# Patient Record
Sex: MALE | Race: BLACK OR AFRICAN AMERICAN | NOT HISPANIC OR LATINO | URBAN - METROPOLITAN AREA
[De-identification: names, ages, dates, MRNs, and addresses within clinical notes are randomized per-mention and may not be internally consistent; named-entity substitution may affect disease eponyms.]

---

## 2017-11-11 ENCOUNTER — OUTPATIENT (OUTPATIENT)
Dept: OUTPATIENT SERVICES | Facility: HOSPITAL | Age: 57
LOS: 1 days | Discharge: HOME | End: 2017-11-11

## 2017-11-11 DIAGNOSIS — M79.641 PAIN IN RIGHT HAND: ICD-10-CM

## 2017-11-11 DIAGNOSIS — Z00.01 ENCOUNTER FOR GENERAL ADULT MEDICAL EXAMINATION WITH ABNORMAL FINDINGS: ICD-10-CM

## 2017-11-17 ENCOUNTER — OUTPATIENT (OUTPATIENT)
Dept: OUTPATIENT SERVICES | Facility: HOSPITAL | Age: 57
LOS: 1 days | Discharge: HOME | End: 2017-11-17

## 2017-11-21 DIAGNOSIS — K64.4 RESIDUAL HEMORRHOIDAL SKIN TAGS: ICD-10-CM

## 2017-11-21 DIAGNOSIS — Z85.038 PERSONAL HISTORY OF OTHER MALIGNANT NEOPLASM OF LARGE INTESTINE: ICD-10-CM

## 2017-11-21 DIAGNOSIS — D12.2 BENIGN NEOPLASM OF ASCENDING COLON: ICD-10-CM

## 2017-11-21 DIAGNOSIS — Z90.49 ACQUIRED ABSENCE OF OTHER SPECIFIED PARTS OF DIGESTIVE TRACT: ICD-10-CM

## 2017-11-21 DIAGNOSIS — D12.3 BENIGN NEOPLASM OF TRANSVERSE COLON: ICD-10-CM

## 2017-11-21 DIAGNOSIS — K64.8 OTHER HEMORRHOIDS: ICD-10-CM

## 2017-11-22 DIAGNOSIS — K52.9 NONINFECTIVE GASTROENTERITIS AND COLITIS, UNSPECIFIED: ICD-10-CM

## 2019-07-01 ENCOUNTER — EMERGENCY (EMERGENCY)
Facility: HOSPITAL | Age: 59
LOS: 0 days | Discharge: HOME | End: 2019-07-01
Attending: EMERGENCY MEDICINE | Admitting: EMERGENCY MEDICINE
Payer: COMMERCIAL

## 2019-07-01 VITALS
SYSTOLIC BLOOD PRESSURE: 154 MMHG | HEART RATE: 54 BPM | RESPIRATION RATE: 18 BRPM | WEIGHT: 171.08 LBS | OXYGEN SATURATION: 99 % | HEIGHT: 64 IN | TEMPERATURE: 99 F | DIASTOLIC BLOOD PRESSURE: 85 MMHG

## 2019-07-01 DIAGNOSIS — K61.0 ANAL ABSCESS: ICD-10-CM

## 2019-07-01 DIAGNOSIS — K61.1 RECTAL ABSCESS: ICD-10-CM

## 2019-07-01 DIAGNOSIS — L02.91 CUTANEOUS ABSCESS, UNSPECIFIED: ICD-10-CM

## 2019-07-01 PROCEDURE — 99283 EMERGENCY DEPT VISIT LOW MDM: CPT

## 2019-07-01 RX ORDER — IBUPROFEN 200 MG
1 TABLET ORAL
Qty: 40 | Refills: 0
Start: 2019-07-01 | End: 2019-07-10

## 2019-07-01 RX ORDER — CIPROFLOXACIN LACTATE 400MG/40ML
1 VIAL (ML) INTRAVENOUS
Qty: 20 | Refills: 0
Start: 2019-07-01 | End: 2019-07-10

## 2019-07-01 RX ORDER — METRONIDAZOLE 500 MG
1 TABLET ORAL
Qty: 21 | Refills: 0
Start: 2019-07-01 | End: 2019-07-07

## 2019-07-01 NOTE — CONSULT NOTE ADULT - SUBJECTIVE AND OBJECTIVE BOX
.  59 year old male with pmhx noted presents in ER with abscess to right side of rectum over last few days. Pt went to PMD who spoke to Dr. Mckeon and was sent to ED for evaluation.  Patient admits to moderate to severe pain from abscess.    Patient denies drainage, denies fever, chills, tremors, N/V/D/C, incontinence, CP or SOB.        PAST MEDICAL & SURGICAL HISTORY:  No pertinent past medical history  No significant past surgical history      Social History:   Denies tobacco, Etoh or drug use.      Family History:   Non-contributory.      Home Medications:   * Patient Currently denies home medications.      Allergies:  No Known Allergies or Intolerances      Review of systems:  As per HPI otherwise non-contributory.        PHYSICAL EXAM:    General: WD, WN male, Conversant in NAD.    Back: no spinal tenderness.     Respiratory: CTA B/L.    Cardiovascular:  S1 & S2, RRR.    Gastrointestinal: + BS, soft, no distention, non-tender, no rebound or guarding or peritoneal signs.    Rectal:  + Right sided perianal abscess noted,  TTP,  no cellulitis or D/C.         Labs:  No labs taken by ER.      No radiographic studies done in ER.

## 2019-07-01 NOTE — ED PROVIDER NOTE - NSFOLLOWUPINSTRUCTIONS_ED_ALL_ED_FT
Abscess    An abscess is an infected area that contains a collection of pus and debris. It can occur in almost any part of the body and occurs when the tissue gets infection. Symptoms include a painful mass that is red, warm, tender that might break open and have drainage. If your health care provider gave you antibiotics make sure to take the full course and do not stop even if feeling better.     SEEK MEDICAL CARE IF YOU HAVE THE FOLLOWING SYMPTOMS: chills, fever, muscle aches, or red streaking from the area.    Follow up with Dr Mckeon tomorrow

## 2019-07-01 NOTE — ED PROVIDER NOTE - CLINICAL SUMMARY MEDICAL DECISION MAKING FREE TEXT BOX
Patient was evaluated by dr clay who performed I/D advises discharge with follow up with Dr. Clay   I will discharge.

## 2019-07-01 NOTE — ED PROVIDER NOTE - CARE PROVIDER_API CALL
Wiley Mckeon)  Surgery  [address] [place] [address]  Phone: [phone]  Fax: [phone]  Follow Up Time: 1-3 Days

## 2019-07-01 NOTE — ED PROVIDER NOTE - ATTENDING CONTRIBUTION TO CARE
I was present for and supervised the key and critical aspects of the procedures performed during the care of the patient. Patient presents for evaluation of potential abscess sent in to see dr clay for I/D   he is afebrile with no vomiting   A/P- I will discharge patient s/p I/D by dr clay

## 2019-07-01 NOTE — ED PROVIDER NOTE - PROGRESS NOTE DETAILS
surgery evaluated pt, and performed I& D.. pt to follow up with dr clay tomorrow for removal of packing. axbx sent

## 2019-07-01 NOTE — ED PROVIDER NOTE - PHYSICAL EXAMINATION
CONST: Well appearing in NAD  CARD: Normal S1 S2; Normal rate and rhythm  RESP: Equal BS B/L, No wheezes, rhonchi or rales. No distress  GI: Soft, non-tender, non-distended. no cva tenderness. normal BS  RECTAL: + perianal abscess noted, no cellulitis   MS: Normal ROM in all extremities. No midline spinal tenderness. pulses 2 +. no calf tenderness or swelling  SKIN: Warm, dry, no acute rashes. Good turgor

## 2019-07-01 NOTE — ED PROVIDER NOTE - OBJECTIVE STATEMENT
59 year old male with pmhx noted presents with abscess to rectum over last few days. pt went to PMD who was sent to ED for evaluation. no fever.

## 2019-07-01 NOTE — CONSULT NOTE ADULT - ASSESSMENT
.  Impression:  Right sided perianal abscess.      Plan:  - Right sided perianal abscess I&D'd bedside in ER under sterile technique by Dr. Mckeon and packing placed by Dr. Mckeon.   Wound C&S taken.   - Case d/w Dr. Mckeon and states ER can discharge home today with Rx of Cipro, Flagyl & Percocet and patient instructed to F/U with Linda Garduno in his office tomorrow.

## 2020-11-05 PROBLEM — Z00.00 ENCOUNTER FOR PREVENTIVE HEALTH EXAMINATION: Status: ACTIVE | Noted: 2020-11-05

## 2020-12-09 ENCOUNTER — LABORATORY RESULT (OUTPATIENT)
Age: 60
End: 2020-12-09

## 2020-12-10 ENCOUNTER — APPOINTMENT (OUTPATIENT)
Dept: UROLOGY | Facility: CLINIC | Age: 60
End: 2020-12-10
Payer: COMMERCIAL

## 2020-12-10 VITALS — BODY MASS INDEX: 26.68 KG/M2 | WEIGHT: 170 LBS | HEIGHT: 67 IN | TEMPERATURE: 98.7 F

## 2020-12-10 DIAGNOSIS — Z78.9 OTHER SPECIFIED HEALTH STATUS: ICD-10-CM

## 2020-12-10 DIAGNOSIS — Z85.038 PERSONAL HISTORY OF OTHER MALIGNANT NEOPLASM OF LARGE INTESTINE: ICD-10-CM

## 2020-12-10 PROCEDURE — 99072 ADDL SUPL MATRL&STAF TM PHE: CPT

## 2020-12-10 PROCEDURE — 99204 OFFICE O/P NEW MOD 45 MIN: CPT

## 2020-12-10 RX ORDER — ACETAMINOPHEN 500 MG
500 TABLET ORAL
Refills: 0 | Status: ACTIVE | COMMUNITY

## 2020-12-10 RX ORDER — ASPIRIN 81 MG
81 TABLET, DELAYED RELEASE (ENTERIC COATED) ORAL
Refills: 0 | Status: ACTIVE | COMMUNITY

## 2020-12-10 NOTE — HISTORY OF PRESENT ILLNESS
[Hematuria - Microscopic] : microscopic hematuria [Urinary Frequency] : urinary frequency [Nocturia] : nocturia [FreeTextEntry1] : 60-year-old male here for initial consultation regarding microhematuria. This was found  on urinalysis by his PCP. Patient denies evidence of gross hematuria. He does report variable voiding symptoms.\par All available data reviewed===\par 10/20 urinalysis= 10-20 red blood cells\par 11/20 PSA= 0.3  BUN= 17  creatinine= 1.2  GFR= 71 [Urinary Urgency] : no urinary urgency [Straining] : no straining [Weak Stream] : no weak stream [Dysuria] : no dysuria [Fever] : no fever

## 2020-12-10 NOTE — ASSESSMENT
[FreeTextEntry1] : #1. Asymptomatic micro-hematuria\par #2. BPH, clinically\par \par Plan\par -CT urogram\par -Urine cytology\par -Return for 6 weeks\par -Consider cystoscopy

## 2020-12-10 NOTE — LETTER BODY
[Dear  ___] : Dear  [unfilled], [Consult Letter:] : I had the pleasure of evaluating your patient, [unfilled]. [( Thank you for referring [unfilled] for consultation for _____ )] : Thank you for referring [unfilled] for consultation for [unfilled] [Consult Closing:] : Thank you very much for allowing me to participate in the care of this patient.  If you have any questions, please do not hesitate to contact me. [FreeTextEntry1] : This is to summarize the consultation for Danny Goff seen December 10, 2020\par \par Impression= clinically significant asymptomatic microhematuria.. BPH found on physical examination.\par \par Plan= CT urogram. Urine cytology . Will need cystoscopy

## 2020-12-10 NOTE — PHYSICAL EXAM
[General Appearance - Well Developed] : well developed [General Appearance - Well Nourished] : well nourished [Normal Appearance] : normal appearance [Well Groomed] : well groomed [General Appearance - In No Acute Distress] : no acute distress [Edema] : no peripheral edema [Respiration, Rhythm And Depth] : normal respiratory rhythm and effort [Exaggerated Use Of Accessory Muscles For Inspiration] : no accessory muscle use [Abdomen Soft] : soft [Abdomen Tenderness] : non-tender [Costovertebral Angle Tenderness] : no ~M costovertebral angle tenderness [Urethral Meatus] : meatus normal [Scrotum] : the scrotum was normal [Testes Mass (___cm)] : there were no testicular masses [No Prostate Nodules] : no prostate nodules [Normal Station and Gait] : the gait and station were normal for the patient's age [] : no rash [No Focal Deficits] : no focal deficits [Oriented To Time, Place, And Person] : oriented to person, place, and time [Affect] : the affect was normal [Mood] : the mood was normal [Not Anxious] : not anxious [No Palpable Adenopathy] : no palpable adenopathy [Penis Abnormality] : normal circumcised penis [Testes Tenderness] : no tenderness of the testes [Prostate Tenderness] : the prostate was not tender [Prostate Size ___ gm] : prostate size [unfilled] gm [Skin Color & Pigmentation] : normal skin color and pigmentation [FreeTextEntry1] : sulcus +

## 2020-12-15 LAB — URINE CYTOLOGY: NORMAL

## 2021-02-03 ENCOUNTER — APPOINTMENT (OUTPATIENT)
Dept: UROLOGY | Facility: CLINIC | Age: 61
End: 2021-02-03

## 2021-02-23 ENCOUNTER — APPOINTMENT (OUTPATIENT)
Dept: UROLOGY | Facility: CLINIC | Age: 61
End: 2021-02-23
Payer: COMMERCIAL

## 2021-02-23 VITALS — TEMPERATURE: 96.3 F | HEIGHT: 67 IN

## 2021-02-23 LAB
BILIRUB UR QL STRIP: NORMAL
CLARITY UR: CLEAR
COLLECTION METHOD: NORMAL
GLUCOSE UR-MCNC: NORMAL
HCG UR QL: 0.2 EU/DL
HGB UR QL STRIP.AUTO: NORMAL
KETONES UR-MCNC: NORMAL
LEUKOCYTE ESTERASE UR QL STRIP: NORMAL
NITRITE UR QL STRIP: NORMAL
PH UR STRIP: 7
PROT UR STRIP-MCNC: NORMAL
SP GR UR STRIP: 1.02

## 2021-02-23 PROCEDURE — 99213 OFFICE O/P EST LOW 20 MIN: CPT

## 2021-02-23 PROCEDURE — 99072 ADDL SUPL MATRL&STAF TM PHE: CPT

## 2021-02-23 PROCEDURE — 81003 URINALYSIS AUTO W/O SCOPE: CPT | Mod: QW

## 2021-02-25 ENCOUNTER — LABORATORY RESULT (OUTPATIENT)
Age: 61
End: 2021-02-25

## 2021-02-25 LAB
ANION GAP SERPL CALC-SCNC: 7 MMOL/L
APPEARANCE: CLEAR
BILIRUBIN URINE: NEGATIVE
BLOOD URINE: ABNORMAL
BUN SERPL-MCNC: 19 MG/DL
CALCIUM SERPL-MCNC: 10 MG/DL
CHLORIDE SERPL-SCNC: 101 MMOL/L
CO2 SERPL-SCNC: 31 MMOL/L
COLOR: NORMAL
CREAT SERPL-MCNC: 1.5 MG/DL
GLUCOSE QUALITATIVE U: NEGATIVE
GLUCOSE SERPL-MCNC: 71 MG/DL
KETONES URINE: NEGATIVE
LEUKOCYTE ESTERASE URINE: NEGATIVE
NITRITE URINE: NEGATIVE
PH URINE: 7.5
POTASSIUM SERPL-SCNC: 4.3 MMOL/L
PROTEIN URINE: NORMAL
SODIUM SERPL-SCNC: 139 MMOL/L
SPECIFIC GRAVITY URINE: 1.01
UROBILINOGEN URINE: NORMAL

## 2021-03-17 ENCOUNTER — RESULT REVIEW (OUTPATIENT)
Age: 61
End: 2021-03-17

## 2021-03-17 ENCOUNTER — OUTPATIENT (OUTPATIENT)
Dept: OUTPATIENT SERVICES | Facility: HOSPITAL | Age: 61
LOS: 1 days | Discharge: HOME | End: 2021-03-17
Payer: COMMERCIAL

## 2021-03-17 DIAGNOSIS — R10.2 PELVIC AND PERINEAL PAIN: ICD-10-CM

## 2021-03-17 DIAGNOSIS — R31.21 ASYMPTOMATIC MICROSCOPIC HEMATURIA: ICD-10-CM

## 2021-03-17 DIAGNOSIS — R10.9 UNSPECIFIED ABDOMINAL PAIN: ICD-10-CM

## 2021-03-17 PROCEDURE — 74178 CT ABD&PLV WO CNTR FLWD CNTR: CPT | Mod: 26

## 2021-03-25 ENCOUNTER — APPOINTMENT (OUTPATIENT)
Dept: UROLOGY | Facility: CLINIC | Age: 61
End: 2021-03-25
Payer: SELF-PAY

## 2021-03-25 ENCOUNTER — NON-APPOINTMENT (OUTPATIENT)
Age: 61
End: 2021-03-25

## 2021-03-25 VITALS — TEMPERATURE: 97.3 F | HEIGHT: 67 IN | BODY MASS INDEX: 27 KG/M2 | WEIGHT: 172 LBS

## 2021-03-25 PROCEDURE — 99214 OFFICE O/P EST MOD 30 MIN: CPT

## 2021-03-25 PROCEDURE — 99072 ADDL SUPL MATRL&STAF TM PHE: CPT

## 2021-04-05 ENCOUNTER — OUTPATIENT (OUTPATIENT)
Dept: OUTPATIENT SERVICES | Facility: HOSPITAL | Age: 61
LOS: 1 days | Discharge: HOME | End: 2021-04-05
Payer: SELF-PAY

## 2021-04-05 VITALS
WEIGHT: 176.81 LBS | HEIGHT: 67 IN | SYSTOLIC BLOOD PRESSURE: 160 MMHG | TEMPERATURE: 98 F | RESPIRATION RATE: 18 BRPM | DIASTOLIC BLOOD PRESSURE: 80 MMHG | HEART RATE: 58 BPM | OXYGEN SATURATION: 99 %

## 2021-04-05 DIAGNOSIS — R31.21 ASYMPTOMATIC MICROSCOPIC HEMATURIA: ICD-10-CM

## 2021-04-05 DIAGNOSIS — Z01.818 ENCOUNTER FOR OTHER PREPROCEDURAL EXAMINATION: ICD-10-CM

## 2021-04-05 DIAGNOSIS — Z98.890 OTHER SPECIFIED POSTPROCEDURAL STATES: Chronic | ICD-10-CM

## 2021-04-05 LAB
ALBUMIN SERPL ELPH-MCNC: 4.3 G/DL — SIGNIFICANT CHANGE UP (ref 3.5–5.2)
ALP SERPL-CCNC: 56 U/L — SIGNIFICANT CHANGE UP (ref 30–115)
ALT FLD-CCNC: 26 U/L — SIGNIFICANT CHANGE UP (ref 0–41)
ANION GAP SERPL CALC-SCNC: 11 MMOL/L — SIGNIFICANT CHANGE UP (ref 7–14)
APPEARANCE UR: CLEAR — SIGNIFICANT CHANGE UP
APTT BLD: 30.5 SEC — SIGNIFICANT CHANGE UP (ref 27–39.2)
AST SERPL-CCNC: 25 U/L — SIGNIFICANT CHANGE UP (ref 0–41)
BACTERIA # UR AUTO: NEGATIVE — SIGNIFICANT CHANGE UP
BASOPHILS # BLD AUTO: 0.03 K/UL — SIGNIFICANT CHANGE UP (ref 0–0.2)
BASOPHILS NFR BLD AUTO: 0.7 % — SIGNIFICANT CHANGE UP (ref 0–1)
BILIRUB SERPL-MCNC: 0.3 MG/DL — SIGNIFICANT CHANGE UP (ref 0.2–1.2)
BILIRUB UR-MCNC: NEGATIVE — SIGNIFICANT CHANGE UP
BUN SERPL-MCNC: 18 MG/DL — SIGNIFICANT CHANGE UP (ref 10–20)
CALCIUM SERPL-MCNC: 9.5 MG/DL — SIGNIFICANT CHANGE UP (ref 8.5–10.1)
CHLORIDE SERPL-SCNC: 102 MMOL/L — SIGNIFICANT CHANGE UP (ref 98–110)
CO2 SERPL-SCNC: 26 MMOL/L — SIGNIFICANT CHANGE UP (ref 17–32)
COLOR SPEC: SIGNIFICANT CHANGE UP
CREAT SERPL-MCNC: 1.2 MG/DL — SIGNIFICANT CHANGE UP (ref 0.7–1.5)
DIFF PNL FLD: ABNORMAL
EOSINOPHIL # BLD AUTO: 0.06 K/UL — SIGNIFICANT CHANGE UP (ref 0–0.7)
EOSINOPHIL NFR BLD AUTO: 1.5 % — SIGNIFICANT CHANGE UP (ref 0–8)
EPI CELLS # UR: 0 /HPF — SIGNIFICANT CHANGE UP (ref 0–5)
GLUCOSE SERPL-MCNC: 96 MG/DL — SIGNIFICANT CHANGE UP (ref 70–99)
GLUCOSE UR QL: NEGATIVE — SIGNIFICANT CHANGE UP
HCT VFR BLD CALC: 44.2 % — SIGNIFICANT CHANGE UP (ref 42–52)
HGB BLD-MCNC: 14.1 G/DL — SIGNIFICANT CHANGE UP (ref 14–18)
HYALINE CASTS # UR AUTO: 0 /LPF — SIGNIFICANT CHANGE UP (ref 0–7)
IMM GRANULOCYTES NFR BLD AUTO: 0.2 % — SIGNIFICANT CHANGE UP (ref 0.1–0.3)
INR BLD: 1.07 RATIO — SIGNIFICANT CHANGE UP (ref 0.65–1.3)
KETONES UR-MCNC: NEGATIVE — SIGNIFICANT CHANGE UP
LEUKOCYTE ESTERASE UR-ACNC: NEGATIVE — SIGNIFICANT CHANGE UP
LYMPHOCYTES # BLD AUTO: 1.06 K/UL — LOW (ref 1.2–3.4)
LYMPHOCYTES # BLD AUTO: 25.7 % — SIGNIFICANT CHANGE UP (ref 20.5–51.1)
MCHC RBC-ENTMCNC: 25.8 PG — LOW (ref 27–31)
MCHC RBC-ENTMCNC: 31.9 G/DL — LOW (ref 32–37)
MCV RBC AUTO: 80.8 FL — SIGNIFICANT CHANGE UP (ref 80–94)
MONOCYTES # BLD AUTO: 0.41 K/UL — SIGNIFICANT CHANGE UP (ref 0.1–0.6)
MONOCYTES NFR BLD AUTO: 10 % — HIGH (ref 1.7–9.3)
NEUTROPHILS # BLD AUTO: 2.55 K/UL — SIGNIFICANT CHANGE UP (ref 1.4–6.5)
NEUTROPHILS NFR BLD AUTO: 61.9 % — SIGNIFICANT CHANGE UP (ref 42.2–75.2)
NITRITE UR-MCNC: NEGATIVE — SIGNIFICANT CHANGE UP
NRBC # BLD: 0 /100 WBCS — SIGNIFICANT CHANGE UP (ref 0–0)
PH UR: 6.5 — SIGNIFICANT CHANGE UP (ref 5–8)
PLATELET # BLD AUTO: 165 K/UL — SIGNIFICANT CHANGE UP (ref 130–400)
POTASSIUM SERPL-MCNC: 4.3 MMOL/L — SIGNIFICANT CHANGE UP (ref 3.5–5)
POTASSIUM SERPL-SCNC: 4.3 MMOL/L — SIGNIFICANT CHANGE UP (ref 3.5–5)
PROT SERPL-MCNC: 6.9 G/DL — SIGNIFICANT CHANGE UP (ref 6–8)
PROT UR-MCNC: SIGNIFICANT CHANGE UP
PROTHROM AB SERPL-ACNC: 12.3 SEC — SIGNIFICANT CHANGE UP (ref 9.95–12.87)
RBC # BLD: 5.47 M/UL — SIGNIFICANT CHANGE UP (ref 4.7–6.1)
RBC # FLD: 13.8 % — SIGNIFICANT CHANGE UP (ref 11.5–14.5)
RBC CASTS # UR COMP ASSIST: 11 /HPF — HIGH (ref 0–4)
SODIUM SERPL-SCNC: 139 MMOL/L — SIGNIFICANT CHANGE UP (ref 135–146)
SP GR SPEC: 1.02 — SIGNIFICANT CHANGE UP (ref 1.01–1.03)
UROBILINOGEN FLD QL: SIGNIFICANT CHANGE UP
WBC # BLD: 4.12 K/UL — LOW (ref 4.8–10.8)
WBC # FLD AUTO: 4.12 K/UL — LOW (ref 4.8–10.8)
WBC UR QL: 0 /HPF — SIGNIFICANT CHANGE UP (ref 0–5)

## 2021-04-05 PROCEDURE — 93010 ELECTROCARDIOGRAM REPORT: CPT

## 2021-04-05 RX ORDER — METHOCARBAMOL 500 MG/1
0 TABLET, FILM COATED ORAL
Qty: 0 | Refills: 0 | DISCHARGE

## 2021-04-05 NOTE — H&P PST ADULT - REASON FOR ADMISSION
61 y/o male presents to PAST for cystoscopy bladder biopsy and fulguration, left retrograde pyelogram with Dr. Rider in [facility] under [place] on 4/12/21

## 2021-04-05 NOTE — H&P PST ADULT - NSICDXPASTMEDICALHX_GEN_ALL_CORE_FT
PAST MEDICAL HISTORY:  BPH (benign prostatic hyperplasia)     Left nephrolithiasis     Microscopic hematuria

## 2021-04-05 NOTE — H&P PST ADULT - NSANTHOSAYNRD_GEN_A_CORE
No. STEPHANIA screening performed.  STOP BANG Legend: 0-2 = LOW Risk; 3-4 = INTERMEDIATE Risk; 5-8 = HIGH Risk

## 2021-04-05 NOTE — H&P PST ADULT - HISTORY OF PRESENT ILLNESS
59 y/o male presents to PAST for cystoscopy bladder biopsy and fulguration, left retrograde pyelogram with Dr. Rider in [facility] under [place] on 4/12/21  Pt had microhematuria found on urinalysis by his pcp, pt denies evidence of gross hematouria, urine cytology- neg for benoit on  12/2020. U/A 2/25/21 RBC, ct urogram images visualized from 3/2021- 2 nonobstructive left renal calculi measuring up to 4mm, left renal cyst. Pt recommended for above procedure due to microhematuria and left renal calcui. Pt states does not notice any hematuria when urinating, no difficulty or pain when urinating. pt is ready for above procedure.    PATIENT CURRENTLY DENIES CHEST PAIN  SHORTNESS OF BREATH  PALPITATIONS,  DYSURIA, OR UPPER RESPIRATORY INFECTION IN PAST 2 WEEKS  EXERCISE  TOLERANCE  2-3 FLIGHT OF STAIRS  WITHOUT SHORTNESS OF BREATH  denies travel outside the [place] in the past 30 days  pt denies any covid s/s, or tested positive in the past  pt advised self quarantine till day of procedure    Anesthesia Alert  NO--Difficult Airway  NO--History of neck surgery or radiation  NO--Limited ROM of neck  NO--History of Malignant hyperthermia  NO--No personal or family history of Pseudocholinesterase deficiency.  NO--Prior Anesthesia Complication  NO--Latex Allergy  NO--Loose teeth  NO--History of Rheumatoid Arthritis  NO--STEPHANIA  NO--Other_____  Class IV airway    R31.21, 77309    ^R31.21, 70366    No pertinent family history in first degree relatives    No pertinent past medical history    No significant past surgical history

## 2021-04-06 LAB
CULTURE RESULTS: SIGNIFICANT CHANGE UP
SPECIMEN SOURCE: SIGNIFICANT CHANGE UP

## 2021-04-09 ENCOUNTER — LABORATORY RESULT (OUTPATIENT)
Age: 61
End: 2021-04-09

## 2021-04-09 ENCOUNTER — OUTPATIENT (OUTPATIENT)
Dept: OUTPATIENT SERVICES | Facility: HOSPITAL | Age: 61
LOS: 1 days | Discharge: HOME | End: 2021-04-09

## 2021-04-09 DIAGNOSIS — Z98.890 OTHER SPECIFIED POSTPROCEDURAL STATES: Chronic | ICD-10-CM

## 2021-04-09 DIAGNOSIS — Z11.59 ENCOUNTER FOR SCREENING FOR OTHER VIRAL DISEASES: ICD-10-CM

## 2021-04-09 PROBLEM — N20.0 CALCULUS OF KIDNEY: Chronic | Status: ACTIVE | Noted: 2021-04-05

## 2021-04-09 PROBLEM — R31.29 OTHER MICROSCOPIC HEMATURIA: Chronic | Status: ACTIVE | Noted: 2021-04-05

## 2021-04-09 PROBLEM — N40.0 BENIGN PROSTATIC HYPERPLASIA WITHOUT LOWER URINARY TRACT SYMPTOMS: Chronic | Status: ACTIVE | Noted: 2021-04-05

## 2021-04-12 ENCOUNTER — APPOINTMENT (OUTPATIENT)
Dept: UROLOGY | Facility: HOSPITAL | Age: 61
End: 2021-04-12

## 2021-04-12 ENCOUNTER — OUTPATIENT (OUTPATIENT)
Dept: OUTPATIENT SERVICES | Facility: HOSPITAL | Age: 61
LOS: 1 days | Discharge: HOME | End: 2021-04-12
Payer: SELF-PAY

## 2021-04-12 VITALS
HEIGHT: 66 IN | WEIGHT: 171.08 LBS | DIASTOLIC BLOOD PRESSURE: 79 MMHG | TEMPERATURE: 98 F | SYSTOLIC BLOOD PRESSURE: 143 MMHG | RESPIRATION RATE: 17 BRPM | OXYGEN SATURATION: 99 % | HEART RATE: 72 BPM

## 2021-04-12 VITALS — SYSTOLIC BLOOD PRESSURE: 145 MMHG | RESPIRATION RATE: 17 BRPM | DIASTOLIC BLOOD PRESSURE: 82 MMHG | HEART RATE: 61 BPM

## 2021-04-12 DIAGNOSIS — Z98.890 OTHER SPECIFIED POSTPROCEDURAL STATES: Chronic | ICD-10-CM

## 2021-04-12 PROCEDURE — 52000 CYSTOURETHROSCOPY: CPT

## 2021-04-12 PROCEDURE — 74420 UROGRAPHY RTRGR +-KUB: CPT | Mod: 26,LT

## 2021-04-12 RX ORDER — ONDANSETRON 8 MG/1
4 TABLET, FILM COATED ORAL ONCE
Refills: 0 | Status: DISCONTINUED | OUTPATIENT
Start: 2021-04-12 | End: 2021-04-12

## 2021-04-12 RX ORDER — SODIUM CHLORIDE 9 MG/ML
1000 INJECTION, SOLUTION INTRAVENOUS
Refills: 0 | Status: DISCONTINUED | OUTPATIENT
Start: 2021-04-12 | End: 2021-04-12

## 2021-04-12 RX ORDER — PHENAZOPYRIDINE HCL 100 MG
200 TABLET ORAL ONCE
Refills: 0 | Status: COMPLETED | OUTPATIENT
Start: 2021-04-12 | End: 2021-04-12

## 2021-04-12 RX ORDER — PHENAZOPYRIDINE HCL 100 MG
1 TABLET ORAL
Qty: 15 | Refills: 3
Start: 2021-04-12 | End: 2021-05-01

## 2021-04-12 RX ORDER — CEFUROXIME AXETIL 250 MG
1 TABLET ORAL
Qty: 6 | Refills: 0
Start: 2021-04-12 | End: 2021-04-14

## 2021-04-12 RX ORDER — HYDROMORPHONE HYDROCHLORIDE 2 MG/ML
0.5 INJECTION INTRAMUSCULAR; INTRAVENOUS; SUBCUTANEOUS
Refills: 0 | Status: DISCONTINUED | OUTPATIENT
Start: 2021-04-12 | End: 2021-04-12

## 2021-04-12 RX ADMIN — Medication 200 MILLIGRAM(S): at 13:07

## 2021-04-12 NOTE — BRIEF OPERATIVE NOTE - NSICDXBRIEFPOSTOP_GEN_ALL_CORE_FT
POST-OP DIAGNOSIS:  BPH with obstruction/lower urinary tract symptoms 12-Apr-2021 12:48:10  Gerhard LAZAR

## 2021-04-12 NOTE — ASU DISCHARGE PLAN (ADULT/PEDIATRIC) - ASU DC SPECIAL INSTRUCTIONSFT
expect some blood  in urine   expect some pain w /voiding  call office now for af/up appt in 3 weeks   ABT and pyridium for burning in pharm

## 2021-04-12 NOTE — CHART NOTE - NSCHARTNOTEFT_GEN_A_CORE
PACU ANESTHESIA ADMISSION NOTE      Procedure: Cystoscopy with retrograde pyelography in adult      Post op diagnosis:      ____  Intubated  TV:______       Rate: ______      FiO2: ______    __x__  Patent Airway    __x__  Full return of protective reflexes    __x__  Full recovery from anesthesia / back to baseline     Vitals:   T: 98.0          R:    18              BP:  128/75                 Sat:    100               P:  82      Mental Status:  __x__ Awake   _____ Alert   _____ Drowsy   _____ Sedated    Nausea/Vomiting:  ___x_ NO  ______Yes,   See Post - Op Orders          Pain Scale (0-10):  __0___    Treatment: ____ None    ____ See Post - Op/PCA Orders    Post - Operative Fluids:   ____ Oral   ___x_ See Post - Op Orders    Plan: Discharge:   __x__Home       _____Floor     _____Critical Care    _____  Other:_________________    Comments:

## 2021-04-21 DIAGNOSIS — Z79.82 LONG TERM (CURRENT) USE OF ASPIRIN: ICD-10-CM

## 2021-04-21 DIAGNOSIS — R31.21 ASYMPTOMATIC MICROSCOPIC HEMATURIA: ICD-10-CM

## 2021-04-21 DIAGNOSIS — N40.0 BENIGN PROSTATIC HYPERPLASIA WITHOUT LOWER URINARY TRACT SYMPTOMS: ICD-10-CM

## 2021-04-28 ENCOUNTER — APPOINTMENT (OUTPATIENT)
Dept: UROLOGY | Facility: CLINIC | Age: 61
End: 2021-04-28
Payer: SELF-PAY

## 2021-04-28 PROCEDURE — 99072 ADDL SUPL MATRL&STAF TM PHE: CPT

## 2021-04-28 PROCEDURE — 99213 OFFICE O/P EST LOW 20 MIN: CPT

## 2021-11-10 ENCOUNTER — RESULT CHARGE (OUTPATIENT)
Age: 61
End: 2021-11-10

## 2021-11-10 ENCOUNTER — APPOINTMENT (OUTPATIENT)
Dept: UROLOGY | Facility: CLINIC | Age: 61
End: 2021-11-10
Payer: SELF-PAY

## 2021-11-10 VITALS — BODY MASS INDEX: 29.37 KG/M2 | WEIGHT: 172 LBS | HEIGHT: 64 IN

## 2021-11-10 DIAGNOSIS — N28.1 CYST OF KIDNEY, ACQUIRED: ICD-10-CM

## 2021-11-10 DIAGNOSIS — R31.21 ASYMPTOMATIC MICROSCOPIC HEMATURIA: ICD-10-CM

## 2021-11-10 DIAGNOSIS — N13.8 BENIGN PROSTATIC HYPERPLASIA WITH LOWER URINARY TRACT SYMPMS: ICD-10-CM

## 2021-11-10 DIAGNOSIS — N20.0 CALCULUS OF KIDNEY: ICD-10-CM

## 2021-11-10 DIAGNOSIS — N40.1 BENIGN PROSTATIC HYPERPLASIA WITH LOWER URINARY TRACT SYMPMS: ICD-10-CM

## 2021-11-10 LAB
BILIRUB UR QL STRIP: NORMAL
CLARITY UR: CLEAR
COLLECTION METHOD: NORMAL
GLUCOSE UR-MCNC: NORMAL
HCG UR QL: 0.2 EU/DL
HGB UR QL STRIP.AUTO: NORMAL
KETONES UR-MCNC: NORMAL
LEUKOCYTE ESTERASE UR QL STRIP: NORMAL
NITRITE UR QL STRIP: NORMAL
PH UR STRIP: 8
PROT UR STRIP-MCNC: NORMAL
SP GR UR STRIP: 1.02

## 2021-11-10 PROCEDURE — 99213 OFFICE O/P EST LOW 20 MIN: CPT

## 2021-11-29 ENCOUNTER — EMERGENCY (EMERGENCY)
Facility: HOSPITAL | Age: 61
LOS: 0 days | Discharge: HOME | End: 2021-11-30
Attending: EMERGENCY MEDICINE | Admitting: EMERGENCY MEDICINE
Payer: SELF-PAY

## 2021-11-29 VITALS
SYSTOLIC BLOOD PRESSURE: 173 MMHG | TEMPERATURE: 97 F | HEART RATE: 64 BPM | DIASTOLIC BLOOD PRESSURE: 90 MMHG | OXYGEN SATURATION: 99 % | HEIGHT: 66 IN | WEIGHT: 169.98 LBS | RESPIRATION RATE: 18 BRPM

## 2021-11-29 DIAGNOSIS — L02.31 CUTANEOUS ABSCESS OF BUTTOCK: ICD-10-CM

## 2021-11-29 DIAGNOSIS — Z98.890 OTHER SPECIFIED POSTPROCEDURAL STATES: Chronic | ICD-10-CM

## 2021-11-29 PROCEDURE — 99283 EMERGENCY DEPT VISIT LOW MDM: CPT | Mod: 25

## 2021-11-29 PROCEDURE — 10060 I&D ABSCESS SIMPLE/SINGLE: CPT

## 2021-11-29 NOTE — ED ADULT TRIAGE NOTE - SOURCE OF INFORMATION
Patient called back clinic and scheduled nurse visit on Friday, 2/15/19 at 4:10 PM for 2nd Shingrix vaccine.  Shingrix vaccine placed in POD B vaccine refrigerator with patient's name and appointment information.   Patient

## 2021-11-30 NOTE — ED PROVIDER NOTE - ATTENDING CONTRIBUTION TO CARE
60 yo M with hx of pilonidal cyst presetns with couple days of R buttocks area of swelling and pain. no redness or fever. not over anus and no bowel abnormalities. on exam, nontoxic, vss  R medial buttocks with 2x2cm area with mild ttp, no redness or induration. not near anus. packing in place from 1cm incision. no further purulent d/c and no signs of cellultis. uncompliacted buttocks abscess. Return precautions discussed. refer to surgery.

## 2021-11-30 NOTE — ED PROVIDER NOTE - PATIENT PORTAL LINK FT
You can access the FollowMyHealth Patient Portal offered by [facility] by registering at the following website: [email or web address]. By joining [email or web address]’s FollowMyHealth portal, you will also be able to view your health information using other applications (apps) compatible with our system.

## 2021-11-30 NOTE — ED PROVIDER NOTE - NSFOLLOWUPCLINICS_GEN_ALL_ED_FT
[facility]  Surgery  [address] [place] [address]  Phone: [phone]  Fax:   Follow Up Time: 1-3 Days

## 2021-11-30 NOTE — ED PROVIDER NOTE - CARE PROVIDER_API CALL
Jai Cameron)  Surgery  [facility] [place] [address]  Phone: [phone]  Fax: [phone]  Follow Up Time: 1-3 Days

## 2021-11-30 NOTE — ED PROVIDER NOTE - OBJECTIVE STATEMENT
The pt is a 61y M w/ hx of buttock abscess presenting for same issue. Pt describes pain, swelling to R buttock x 2 days. Previous instance was several years ago. Denies fever, pain with bowel movements, melena/hematochezia. No chest pain, SOB, N/V, abdominal pain, dysuria.

## 2021-11-30 NOTE — ED PROVIDER NOTE - NSFOLLOWUPINSTRUCTIONS_ED_ALL_ED_FT
Abscess Incision and Drainage    WHAT YOU NEED TO KNOW:    An abscess incision and drainage (I and D) is a procedure to drain pus from an abscess and clean it out so it can heal.    DISCHARGE INSTRUCTIONS:    Contact your healthcare provider if:   •The area around your abscess has red streaks or is warm and painful.   •You have a fever or chills.   •You have increased redness, swelling, or pain in your wound.  •Your wound does not start to heal after a few days.  •Your abscess returns.   •You have questions or concerns about your condition or care.    Medicines:   •NSAIDs, such as ibuprofen, help decrease swelling, pain, and fever. NSAIDs can cause stomach bleeding or kidney problems in certain people. If you take blood thinner medicine, always ask your healthcare provider if NSAIDs are safe for you. Always read the medicine label and follow directions.  •Take your medicine as directed. Contact your healthcare provider if you think your medicine is not helping or if you have side effects. Tell him or her if you are allergic to any medicine. Keep a list of the medicines, vitamins, and herbs you take. Include the amounts, and when and why you take them. Bring the list or the pill bottles to follow-up visits. Carry your medicine list with you in case of an emergency.    Care for your wound as directed:   •Do not remove your bandage unless your healthcare provider says it is okay. Keep the bandage clean and dry. Remove your bandage and clean the wound once your healthcare provider gives you directions.   •Apply heat on the bandage over your wound for 20 to 30 minutes every 2 hours for as many days as directed. This will increase blood flow to the area and help it heal.  •Elevate your wound above level of your heart as often as you can. This will help decrease swelling and pain. Prop your wounded area on pillows or blankets to keep it elevated comfortably.      Follow up with your healthcare provider as directed: You may need to return in 1 to 3 days to have the gauze in your wound removed and your wound examined. You may be taught how to change the gauze in your wound. Write down your questions so you remember to ask them during your visits.

## 2021-11-30 NOTE — ED PROVIDER NOTE - NS ED ROS FT
Constitutional: (-) fever  Eyes/ENT: (-) blurry vision, (-) epistaxis  Cardiovascular: (-) chest pain, (-) syncope  Respiratory: (-) cough, (-) shortness of breath  Gastrointestinal: (-) vomiting, (-) diarrhea  Musculoskeletal: (-) neck pain, (-) back pain, (-) joint pain, (+) R buttock abscess   Integumentary: (-) rash, (-) edema  Neurological: (-) headache, (-) altered mental status  Psychiatric: (-) hallucinations  Allergic/Immunologic: (-) pruritus

## 2021-11-30 NOTE — ED PROVIDER NOTE - PHYSICAL EXAMINATION
Vital Signs: I have reviewed the initial vital signs.  Constitutional: well-nourished, appears stated age, no acute distress  Cardiovascular: regular rate, regular rhythm, well-perfused extremities  Respiratory: unlabored respiratory effort, clear to auscultation bilaterally  Gastrointestinal: soft, non-tender abdomen  Musculoskeletal: supple neck, no lower extremity edema, (+) R buttock inferior region 1x1 cm blistered skin surrounded by larger area of induration, ttp   Integumentary: warm, dry, no rash  Neurologic: awake, alert,  extremities’ motor and sensory functions grossly intact  Psychiatric: appropriate mood, appropriate affect

## 2021-11-30 NOTE — ED PROVIDER NOTE - CLINICAL SUMMARY MEDICAL DECISION MAKING FREE TEXT BOX
62 yo M with hx of pilonidal cyst presetns with couple days of R buttocks area of swelling and pain. no redness or fever. not over anus and no bowel abnormalities. on exam, nontoxic, vss  R medial buttocks with 2x2cm area with mild ttp, no redness or induration. not near anus. packing in place from 1cm incision. no further purulent d/c and no signs of cellultis. uncompliacted buttocks abscess. Return precautions discussed. refer to surgery.

## 2021-11-30 NOTE — ED ADULT NURSE NOTE - NS ED NURSE LEVEL OF CONSCIOUSNESS ORIENTATION
Host at bedside speaking with pt     Jarett Boyd RN  10/16/19 [id number] Oriented - self; Oriented - place; Oriented - time

## 2021-12-13 ENCOUNTER — APPOINTMENT (OUTPATIENT)
Dept: SURGERY | Facility: CLINIC | Age: 61
End: 2021-12-13
Payer: SELF-PAY

## 2021-12-13 VITALS
SYSTOLIC BLOOD PRESSURE: 124 MMHG | BODY MASS INDEX: 30.73 KG/M2 | TEMPERATURE: 97.8 F | DIASTOLIC BLOOD PRESSURE: 84 MMHG | HEART RATE: 80 BPM | HEIGHT: 64 IN | WEIGHT: 180 LBS

## 2021-12-13 PROCEDURE — 46600 DIAGNOSTIC ANOSCOPY SPX: CPT

## 2021-12-13 PROCEDURE — 99203 OFFICE O/P NEW LOW 30 MIN: CPT | Mod: 25

## 2021-12-21 ENCOUNTER — NON-APPOINTMENT (OUTPATIENT)
Age: 61
End: 2021-12-21

## 2021-12-21 NOTE — ASSESSMENT
[FreeTextEntry1] : 61M with recurrent abscess and possible fistula\par \par I discussed the possibility of anal fistula with the patient.  We will obtain an MRI to evaluate for sphincter involvement.  He will return after his images to discuss surgery.

## 2021-12-21 NOTE — HISTORY OF PRESENT ILLNESS
[FreeTextEntry1] : Patient is 61M with PMH of colon cancer s/p right hemicolectomy at [facility] in 2010 who presents for evaluation of recurrent abscess.  Patient had the abscess drained twice in 2019.  He had recurrence 2 weeks ago that has now spontaneously drained.  He has BM TID. Patient denies fevers, chills, nausea, vomiting, abdominal pain, constipation, diarrhea, blood in his stool or unexpected weight loss.  Patient denies a family history of colon cancer rectal cancer or inflammatory bowel disease.  Lasts colonoscopy was at [facility], we do not have those results.

## 2021-12-21 NOTE — PROCEDURE
[FreeTextEntry1] : OLGA and anoscopy show normal sphincter tone, normal internal hemorrhoids and no masses.\par

## 2021-12-21 NOTE — PHYSICAL EXAM
[Abdomen Masses] : No abdominal masses [Abdomen Tenderness] : ~T No ~M abdominal tenderness [No HSM] : no hepatosplenomegaly [Excoriation] : no perianal excoriation [Fistula] : a fistula [Wart] : no warts [Ulcer ___ cm] : no ulcers [Pilonidal Cyst] : no pilonidal cysts [Tender, Swollen] : nontender, non-swollen [Thrombosed] : that was not thrombosed [Skin Tags] : there were no residual hemorrhoidal skin tags seen [Normal] : was normal [None] : there was no rectal mass  [Respiratory Effort] : normal respiratory effort [Normal Rate and Rhythm] : normal rate and rhythm [de-identified] : external examination shows a chornic external opening draining purulence [de-identified] : awake, alert and in no acute distress

## 2022-02-24 ENCOUNTER — OUTPATIENT (OUTPATIENT)
Dept: OUTPATIENT SERVICES | Facility: HOSPITAL | Age: 62
LOS: 1 days | Discharge: HOME | End: 2022-02-24
Payer: OTHER MISCELLANEOUS

## 2022-02-24 ENCOUNTER — RESULT REVIEW (OUTPATIENT)
Age: 62
End: 2022-02-24

## 2022-02-24 DIAGNOSIS — Z98.890 OTHER SPECIFIED POSTPROCEDURAL STATES: Chronic | ICD-10-CM

## 2022-02-24 DIAGNOSIS — R07.9 CHEST PAIN, UNSPECIFIED: ICD-10-CM

## 2022-02-24 PROCEDURE — 71046 X-RAY EXAM CHEST 2 VIEWS: CPT | Mod: 26

## 2022-03-02 ENCOUNTER — OUTPATIENT (OUTPATIENT)
Dept: OUTPATIENT SERVICES | Facility: HOSPITAL | Age: 62
LOS: 1 days | Discharge: HOME | End: 2022-03-02
Payer: SELF-PAY

## 2022-03-02 DIAGNOSIS — R94.31 ABNORMAL ELECTROCARDIOGRAM [ECG] [EKG]: ICD-10-CM

## 2022-03-02 DIAGNOSIS — Z98.890 OTHER SPECIFIED POSTPROCEDURAL STATES: Chronic | ICD-10-CM

## 2022-03-02 PROCEDURE — 93010 ELECTROCARDIOGRAM REPORT: CPT

## 2022-06-21 ENCOUNTER — APPOINTMENT (OUTPATIENT)
Dept: ORTHOPEDIC SURGERY | Facility: CLINIC | Age: 62
End: 2022-06-21
Payer: OTHER MISCELLANEOUS

## 2022-06-21 VITALS — WEIGHT: 180 LBS | HEIGHT: 64 IN | BODY MASS INDEX: 30.73 KG/M2

## 2022-06-21 PROCEDURE — 99213 OFFICE O/P EST LOW 20 MIN: CPT

## 2022-06-21 PROCEDURE — 99072 ADDL SUPL MATRL&STAF TM PHE: CPT

## 2022-06-21 NOTE — PHYSICAL EXAM
[Right] : right shoulder [] : tenderness at bicipital groove [TWNoteComboBox7] : active forward flexion 175 degrees

## 2022-07-18 ENCOUNTER — APPOINTMENT (OUTPATIENT)
Dept: SURGERY | Facility: CLINIC | Age: 62
End: 2022-07-18

## 2022-07-18 VITALS
OXYGEN SATURATION: 98 % | BODY MASS INDEX: 29.37 KG/M2 | SYSTOLIC BLOOD PRESSURE: 120 MMHG | DIASTOLIC BLOOD PRESSURE: 80 MMHG | HEART RATE: 74 BPM | TEMPERATURE: 97 F | HEIGHT: 64 IN | WEIGHT: 172 LBS

## 2022-07-18 PROCEDURE — 99213 OFFICE O/P EST LOW 20 MIN: CPT

## 2022-07-18 NOTE — PHYSICAL EXAM
[Abdomen Masses] : No abdominal masses [Abdomen Tenderness] : ~T No ~M abdominal tenderness [No HSM] : no hepatosplenomegaly [Excoriation] : no perianal excoriation [Fistula] : a fistula [Wart] : no warts [Ulcer ___ cm] : no ulcers [Pilonidal Cyst] : no pilonidal cysts [Tender, Swollen] : nontender, non-swollen [Thrombosed] : that was not thrombosed [Skin Tags] : there were no residual hemorrhoidal skin tags seen [Normal] : was normal [None] : there was no rectal mass  [Respiratory Effort] : normal respiratory effort [Normal Rate and Rhythm] : normal rate and rhythm [de-identified] : external examination shows a chornic external opening draining purulence in the left lateral location [de-identified] : awake, alert and in no acute distress

## 2022-07-18 NOTE — CONSULT LETTER
[Dear  ___] : Dear  [unfilled], [Consult Letter:] : I had the pleasure of evaluating your patient, [unfilled]. [Please see my note below.] : Please see my note below. [Consult Closing:] : Thank you very much for allowing me to participate in the care of this patient.  If you have any questions, please do not hesitate to contact me. [FreeTextEntry3] : Sincerely,\par \par Joseph Davis MD, Colon and Rectal Surgery\par Director of Colon and Rectal Surgery\par \par [employer] [facility]\par [address] [place] [address]\par [facility], 3rd Floor\par [place], [place] [address]\par Tel [phone] ext [phone]\par Fax [phone]\par

## 2022-07-18 NOTE — ASSESSMENT
[FreeTextEntry1] : 62M with anal fistula\par \par  We will obtain an MRI to evaluate for sphincter involvement. He will return after his images to discuss surgery.

## 2022-07-18 NOTE — HISTORY OF PRESENT ILLNESS
[FreeTextEntry1] : Patient is 61M with PMH of colon cancer s/p right hemicolectomy at [facility] in 2010 who presents for follow up of anal fistula.  Patient had the abscess drained twice in 2019. He was previously seen and recommended to have an MRI but this was not done due to insurance issues.  He had recent recurrence and was treated with abx.  He has BM TID. Patient denies fevers, chills, nausea, vomiting, abdominal pain, constipation, diarrhea, blood in his stool or unexpected weight loss.  Patient denies a family history of colon cancer rectal cancer or inflammatory bowel disease.  Lasts colonoscopy with MD Naranjo we requested these results. PT presents today requesting to schedule surgery. After last visit pt was unable to complete MRI pelvis. \par \par \par \par

## 2022-07-19 ENCOUNTER — NON-APPOINTMENT (OUTPATIENT)
Age: 62
End: 2022-07-19

## 2022-08-16 ENCOUNTER — APPOINTMENT (OUTPATIENT)
Dept: SURGERY | Facility: CLINIC | Age: 62
End: 2022-08-16

## 2022-08-16 VITALS
TEMPERATURE: 97 F | HEART RATE: 72 BPM | HEIGHT: 64 IN | OXYGEN SATURATION: 98 % | BODY MASS INDEX: 29.37 KG/M2 | WEIGHT: 172 LBS | SYSTOLIC BLOOD PRESSURE: 135 MMHG | DIASTOLIC BLOOD PRESSURE: 90 MMHG

## 2022-08-16 PROCEDURE — 99213 OFFICE O/P EST LOW 20 MIN: CPT

## 2022-08-19 NOTE — PHYSICAL EXAM
[Abdomen Masses] : No abdominal masses [Abdomen Tenderness] : ~T No ~M abdominal tenderness [No HSM] : no hepatosplenomegaly [Excoriation] : no perianal excoriation [Fistula] : a fistula [Wart] : no warts [Ulcer ___ cm] : no ulcers [Pilonidal Cyst] : no pilonidal cysts [Tender, Swollen] : nontender, non-swollen [Thrombosed] : that was not thrombosed [Skin Tags] : there were no residual hemorrhoidal skin tags seen [Normal] : was normal [None] : there was no rectal mass  [Respiratory Effort] : normal respiratory effort [Normal Rate and Rhythm] : normal rate and rhythm [de-identified] : external examination shows a chornic external opening draining purulence in the left lateral location [de-identified] : awake, alert and in no acute distress

## 2022-08-19 NOTE — ASSESSMENT
[FreeTextEntry1] : 62M with anal fistula\par \par Patient need to return to [place] at this time.  He will call us when he has returned to schedule surgery.

## 2022-08-23 ENCOUNTER — APPOINTMENT (OUTPATIENT)
Dept: ORTHOPEDIC SURGERY | Facility: CLINIC | Age: 62
End: 2022-08-23

## 2022-08-23 PROCEDURE — 99213 OFFICE O/P EST LOW 20 MIN: CPT

## 2022-08-23 PROCEDURE — 99072 ADDL SUPL MATRL&STAF TM PHE: CPT

## 2022-08-23 RX ORDER — MELOXICAM 15 MG/1
15 TABLET ORAL
Qty: 30 | Refills: 1 | Status: ACTIVE | COMMUNITY
Start: 2022-08-23 | End: 1900-01-01

## 2022-08-24 ENCOUNTER — APPOINTMENT (OUTPATIENT)
Dept: PAIN MANAGEMENT | Facility: CLINIC | Age: 62
End: 2022-08-24

## 2022-08-24 VITALS
HEIGHT: 65 IN | WEIGHT: 172 LBS | BODY MASS INDEX: 28.66 KG/M2 | DIASTOLIC BLOOD PRESSURE: 86 MMHG | HEART RATE: 64 BPM | SYSTOLIC BLOOD PRESSURE: 172 MMHG

## 2022-08-24 DIAGNOSIS — M25.519 PAIN IN UNSPECIFIED SHOULDER: ICD-10-CM

## 2022-08-24 PROCEDURE — 99214 OFFICE O/P EST MOD 30 MIN: CPT

## 2022-08-24 NOTE — PHYSICAL EXAM
[Normal Coordination] : normal coordination [Normal DTR UE/LE] : normal DTR UE/LE  [Normal Sensation] : normal sensation [Orientated] : orientated [Able to Communicate] : able to communicate [Well Developed] : well developed [Well Nourished] : well nourished [Flexion] : flexion [Extension] : extension [] : clonus not sustained at ankle [TWNoteComboBox7] : forward flexion 75 degrees [de-identified] : extension 30 degrees

## 2022-08-24 NOTE — ASSESSMENT
[FreeTextEntry1] : Mr. Goff is a 62-year-old gentleman with a chief complaint of lower back pain radiating into the lower extremities precipitated by a work related injury occurring on 07/13/2020.  MRI of the lumbar spine does demonstrate disc bulging  Causing moderate bilateral neuroforaminal compromise.  Overall symptoms consistent with lumbar radiculopathy.  At this time he wishes to continue with physical therapy to improve function and he would like to undergo a lumbar epidural steroid injection for pain relief.  Therefore will we request authorization for physical therapy and for a caudal lumbar epidural steroid injection with MAC.   A prescription for diclofenac 75 mg b.i.d. will be sent to his pharmacy. All of his questions were answered and he understood our conversation well.\par \par Medications were given at today's visit.\par \par Patient had a MRI that shows a radicular component along with pain referred into the lower extremity. Patient has trialed rehab (Home exercise, physical therapy or chiropractic care) and medications I will schedule a Caudal 1-3 depending on effectiveness.\par \par The patient has severe anxiety of procedures that necessitates monitored anesthesia care (MAC). The procedure performed will be close to major nerves, arteries, and spinal cord and/or joint structures. Due to the proximity of these structures, we need the patient to be still during the procedure. With the help of MAC, this will be safely achieved and decrease the risk of any complications.\par \par Risk, benefits, pros and cons of procedure were explained to the patient using models and diagrams and their questions were answered.\par \par Thank you for allowing me to assist in the management of this patient. \par \par \par  Best Regards, \par \par \par  Brandie Keene M.D., FAAPMR\par \par \par  Diplomate, American Board of Physical Medicine and Rehabilitation\par  Diplomate, American Board of Pain Medicine \par  Diplomate, American Board of Pain Management\par \par \par \par \par \par

## 2022-08-24 NOTE — PHYSICAL EXAM
[Normal Coordination] : normal coordination [Normal DTR UE/LE] : normal DTR UE/LE  [Normal Sensation] : normal sensation [Orientated] : orientated [Able to Communicate] : able to communicate [Well Developed] : well developed [Well Nourished] : well nourished [Flexion] : flexion [Extension] : extension [] : clonus not sustained at ankle [TWNoteComboBox7] : forward flexion 75 degrees [de-identified] : extension 30 degrees

## 2022-08-24 NOTE — DATA REVIEWED
[FreeTextEntry1] :  MRI of the lumbar spine dated 09/15/2021, shows diffuse disc bulge at L4-5 with moderate to marked bilateral foraminal compromise.  Diffuse disc bulge at L3-4 with moderate left and mild right foraminal compromise.  Mild broad-based left-sided disc protrusion at L2-3 with mild left foraminal compromise.  Probable bilateral renal cysts, the largest approximating 6 cm on the left.  Correlate with renal sonography.\par \par The patient underwent SOAPP (a Screener and Opioid Assessment for Patients with Pain). He scored a 3, which is low risk. \par \par Medications that trigger a UDS: Benzodiazepines (Ativan, Xanax, Valium) etc. Barbiturates, Narcotics (Avinza, Butrans, hydrocodone, Codeine, Cynthia, Methadone, Morphine, MS Contin, Opana, oxycodone, OxyContin, Suboxone, etc.) Pregabalin (Lyrica), Tramadol (Ultracet, Ultram, etc.) Tapentadol (Nucynta) and E-list Drugs (cocaine, THC, etc.) \par  \par Patient has combination of a low risk SOAPP and no risk factors. UDS would be repeated randomly every quarter. \par  \par Risk factors: Bipolar illness, positive for any illicit drugs, history of ETOH and drug abuse, any signs of diversion, sharing medications, selling medications. Non-consistent [place] drug reporting and above 120 mEq of morphine. \par   \par [place] REGISTRY: No data. \par  \par UDS: None collected.\par

## 2022-08-24 NOTE — WORK
[Was the competent medical cause of the injury] : was the competent medical cause of the injury [Are consistent with the injury] : are consistent with the injury [Consistent with my objective findings] : consistent with my objective findings [Total] : total [Does not reveal pre-existing condition(s) that may affect treatment/prognosis] : does not reveal pre-existing condition(s) that may affect treatment/prognosis [Cannot return to work because ________] : cannot return to work because [unfilled] [Unknown at this time] : : unknown at this time [Patient] : patient [No] : No [No Rx restrictions] : No Rx restrictions. [I provided the services listed above] :  I provided the services listed above. [Less than Sedentary Work:] :  Less than Sedentary Work: Unable to meet the requirement of Sedentary Work. [FreeTextEntry1] : fair

## 2022-08-24 NOTE — HISTORY OF PRESENT ILLNESS
[FreeTextEntry1] : ORIGINAL PRESENTATION: Mr. Goff is a 60 year-old male referred by Dr. Mckeon presenting today with a chief complaint of lower back pain. Patient's pain precipitated by work related injury occurring on July 13, 2020. 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 Patient states he injured his back while bending to assist a client. Patient states his lower back pain is mainly localized. He denies any radicular features or numbness and tingling. He initially trialed physical therapy with some moderate relief. Following the incident he was out of work for 2 weeks. He has since returned to work full duty.\par \par The patient has had this pain for 6 months. Patient describes pain as severe, 8/10 on the pain scale. During the last month the pain has been nearly constant with symptoms worsening in no typical pattern. Pain described as sharp, pressure-like. Pain is increased with lying down, standing, sitting, walking. Bowel or bladder habits have not changed.\par \par ACTIVITIES: Patient can sit many hours before pain starts. The patient constantly lies down because of pain. Patient uses no assistive walking device at this time. Patient has difficulty going to work at this time.\par \par PRIOR PAIN TREATMENTS: Moderate relief with physical therapy, Tens, heat treatment.\par \par PRIOR PAIN MEDICATIONS: None.\par \par \par TODAY:  The patient presents for follow-up. He has not been seen since 08/19/2021. He did complete an MRI of the lumbar spine which had been ordered and approved in September of 2021, the results of which were reviewed with him and documented below.  He was then lost to follow-up.  In the interim, his right shoulder pain, which was being managed by Dr. Mckeon in our department of Orthopedic surgery, was worsening.  He is status post right shoulder arthroscopic surgery in March 2022. PT has been requested for his right shoulder by orthopedics.\par \par The reason for the visit is ongoing lower back pain which is radiating into his lower extremities. He had been doing physical therapy of his lower back, however, he needs approval from his workers compensation insurance for more sessions. He states the physical therapy provided moderate relief, improving ability to arise from a seated position and walk with less discomfort. Of note, he has not returned to work and states he spends his days at home. Patient is also Interested in undergoing a lumbar epidural steroid injection for his radicular pain. \par

## 2022-08-24 NOTE — HISTORY OF PRESENT ILLNESS
[de-identified] : 03/09/2022  Surgery - The patient was taken to the OR today. He had a right sh\par tear, impingement, AC joint arthritis, adhesions.\par PROCEDURE PERFORMED:\par 1. Right shoulder arthroscopic extensive debridement of rotator cuff, labrum, \par (14745.59).\par 2. Right shoulder arthroscopic subacromial decompression (18805.59).\par 3. Right shoulder arthroscopic distal clavicle excision (11374.59)\par 4. Right shoulder arthroscopic lysis of adhesions (10612.59).\par \par Pt is s/p right shoulder arthroscopy\par Doing well.\par Pain controlled\par \par NAD\par Right shoulder\par Incisions healed\par Mild diffuse TTP\par Compartments soft and NT\par ff 0-140\par er 40\par ir l1\par NVI\par \par s/p right shoulder arthroscopy\par went over the surgery in detail\par cont pt, needs to improve rom\par hep and pt\par pain control\par fu in 3 months\par not working with temp total disability

## 2022-08-24 NOTE — REASON FOR VISIT
[Initial Consultation] : an initial pain management consultation [Follow-Up Visit] : a follow-up pain management visit [FreeTextEntry2] : lower back pain

## 2022-08-30 ENCOUNTER — FORM ENCOUNTER (OUTPATIENT)
Age: 62
End: 2022-08-30

## 2022-09-19 ENCOUNTER — EMERGENCY (EMERGENCY)
Facility: HOSPITAL | Age: 62
LOS: 0 days | Discharge: HOME | End: 2022-09-19
Attending: EMERGENCY MEDICINE | Admitting: EMERGENCY MEDICINE

## 2022-09-19 VITALS
TEMPERATURE: 96 F | HEART RATE: 78 BPM | RESPIRATION RATE: 16 BRPM | SYSTOLIC BLOOD PRESSURE: 169 MMHG | HEIGHT: 66 IN | WEIGHT: 175.05 LBS | DIASTOLIC BLOOD PRESSURE: 95 MMHG | OXYGEN SATURATION: 99 %

## 2022-09-19 DIAGNOSIS — Z98.890 OTHER SPECIFIED POSTPROCEDURAL STATES: ICD-10-CM

## 2022-09-19 DIAGNOSIS — Z87.442 PERSONAL HISTORY OF URINARY CALCULI: ICD-10-CM

## 2022-09-19 DIAGNOSIS — M54.50 LOW BACK PAIN, UNSPECIFIED: ICD-10-CM

## 2022-09-19 DIAGNOSIS — Z98.890 OTHER SPECIFIED POSTPROCEDURAL STATES: Chronic | ICD-10-CM

## 2022-09-19 DIAGNOSIS — I10 ESSENTIAL (PRIMARY) HYPERTENSION: ICD-10-CM

## 2022-09-19 DIAGNOSIS — N40.0 BENIGN PROSTATIC HYPERPLASIA WITHOUT LOWER URINARY TRACT SYMPTOMS: ICD-10-CM

## 2022-09-19 DIAGNOSIS — Z79.82 LONG TERM (CURRENT) USE OF ASPIRIN: ICD-10-CM

## 2022-09-19 PROCEDURE — 99284 EMERGENCY DEPT VISIT MOD MDM: CPT

## 2022-09-19 RX ORDER — DEXAMETHASONE 0.5 MG/5ML
1 ELIXIR ORAL
Qty: 1 | Refills: 0
Start: 2022-09-19 | End: 2022-09-19

## 2022-09-19 RX ORDER — KETOROLAC TROMETHAMINE 30 MG/ML
30 SYRINGE (ML) INJECTION ONCE
Refills: 0 | Status: DISCONTINUED | OUTPATIENT
Start: 2022-09-19 | End: 2022-09-19

## 2022-09-19 RX ADMIN — Medication 30 MILLIGRAM(S): at 16:45

## 2022-09-19 NOTE — ED PROVIDER NOTE - PATIENT PORTAL LINK FT
You can access the FollowMyHealth Patient Portal offered by [facility] by registering at the following website: [email or web address]. By joining [employer]’s FollowMyHealth portal, you will also be able to view your health information using other applications (apps) compatible with our system.

## 2022-09-19 NOTE — ED PROVIDER NOTE - OBJECTIVE STATEMENT
62-year-old male no past medical history other than hypertension presenting for lower back pain 3 days duration.  No fever chills nausea vomiting chest pain shortness of breath dysuria hematuria testicular pain diarrhea or constipation.  Pain radiating down right leg worse with sitting on the toilet.  No saddle anesthesia urinary bowel incontinence paresthesias or difficulty ambulating.  Patient ambulatory in ER took 1 Tylenol prior to arrival.

## 2022-09-19 NOTE — ED PROVIDER NOTE - NSFOLLOWUPINSTRUCTIONS_ED_ALL_ED_FT
MAKE AN APPOINTMENT WITH PHYSICAL THERAPY. CONTINUE MOTRIN  600 MG EVERY 6 HOURS AS NEEDED FOR PAIN.     Our Emergency Department Referral Coordinators will be reaching out to you in the next 24-48 hours from 9:00am to 5:00pm with a follow up appointment. Please expect a phone call from the hospital in that time frame. If you do not receive a call or if you have any questions or concerns, you can reach them at [phone] or [phone].    Back Pain    WHAT YOU NEED TO KNOW:    Back pain is common. It can be caused by many conditions, such as arthritis or the breakdown of spinal discs. Your risk for back pain is increased by injuries, lack of activity, or repeated bending and twisting. You may feel sore or stiff on one or both sides of your back. The pain may spread to your buttocks or thighs.    DISCHARGE INSTRUCTIONS:    Return to the emergency department if:     You have pain, numbness, or weakness in one or both legs.      Your pain becomes so severe that you cannot walk.      You cannot control your urine or bowel movements.      You have severe back pain with chest pain.      You have severe back pain, nausea, and vomiting.      You have severe back pain that spreads to your side or genital area.    Contact your healthcare provider if:     You have back pain that does not get better with rest and pain medicine.      You have a fever.      You have pain that worsens when you are on your back or when you rest.      You have pain that worsens when you cough or sneeze.      You lose weight without trying.      You have questions or concerns about your condition or care.    Medicines:     NSAIDs help decrease swelling and pain. This medicine is available with or without a doctor's order. NSAIDs can cause stomach bleeding or kidney problems in certain people. If you take blood thinner medicine, always ask your healthcare provider if NSAIDs are safe for you. Always read the medicine label and follow directions.      Acetaminophen decreases pain and fever. It is available without a doctor's order. Ask how much to take and how often to take it. Follow directions. Read the labels of all other medicines you are using to see if they also contain acetaminophen, or ask your doctor or pharmacist. Acetaminophen can cause liver damage if not taken correctly. Do not use more than 4 grams (4,000 milligrams) total of acetaminophen in one day.       Muscle relaxers help decrease muscle spasms and back pain.      Prescription pain medicine may be given. Ask your healthcare provider how to take this medicine safely. Some prescription pain medicines contain acetaminophen. Do not take other medicines that contain acetaminophen without talking to your healthcare provider. Too much acetaminophen may cause liver damage. Prescription pain medicine may cause constipation. Ask your healthcare provider how to prevent or treat constipation.       Take your medicine as directed. Contact your healthcare provider if you think your medicine is not helping or if you have side effects. Tell him or her if you are allergic to any medicine. Keep a list of the medicines, vitamins, and herbs you take. Include the amounts, and when and why you take them. Bring the list or the pill bottles to follow-up visits. Carry your medicine list with you in case of an emergency.    How to manage your back pain:     Apply ice on your back for 15 to 20 minutes every hour or as directed. Use an ice pack, or put crushed ice in a plastic bag. Cover it with a towel before you apply it to your skin. Ice helps prevent tissue damage and decreases pain.      Apply heat on your back for 20 to 30 minutes every 2 hours for as many days as directed. Heat helps decrease pain and muscle spasms.      Stay active as much as you can without causing more pain. Bed rest could make your back pain worse. Avoid heavy lifting until your pain is gone.      Go to physical therapy as directed. A physical therapist can teach you exercises to help improve movement and strength, and to decrease pain.    Follow up with your healthcare provider in 2 weeks, or as directed: Write down your questions so you remember to ask them during your visits.       © Copyright [employer] 2019 All illustrations and images included in CareNotes are the copyrighted property of [employer].A[employer]., Inc. or [email or web address].

## 2022-09-19 NOTE — ED PROVIDER NOTE - PHYSICAL EXAMINATION
Physical Exam    Vital Signs: I have reviewed the initial vital signs.  Constitutional: well-nourished, appears stated age, no acute distress  Eyes: Conjunctiva pink, Sclera clear  Cardiovascular: S1 and S2, regular rate,  Respiratory: unlabored respiratory effort, speaking in full sentences, handling oral secretions, clear to auscultation bilaterally no wheezing, rales and rhonchi  Gastrointestinal: soft, non-tender abdomen, no pulsatile mass, normal bowl sounds , no cvat b/l  Musculoskeletal: supple neck, no lower extremity edema, no midline tenderness, paraspinal tenderness, c moving all extremities appropriately, no gross bony deformities or swelling.  Integumentary: warm, dry, no rashes, lacerations,  Neurologic: awake, alert, cranial nerves II-XII grossly intact, extremities’ motor and sensory functions grossly intact,no ataxia, no

## 2022-09-19 NOTE — ED PROVIDER NOTE - CLINICAL SUMMARY MEDICAL DECISION MAKING FREE TEXT BOX
Patient presented with lower back pain x 3 days. Otherwise afebrile, HD stable, neurovascularly intact. No red flags in hx or on exam. Exam consistent with muscular pain. Given toradol IM with significant improvement of pain after which time patient ambulatory without difficulty. Given the above, will discharge home with outpatient follow up. Patient agreeable with plan. Agrees to return to ED for any new or worsening symptoms.

## 2022-09-19 NOTE — ED ADULT NURSE NOTE - NSIMPLEMENTINTERV_GEN_ALL_ED
Implemented All Fall with Harm Risk Interventions:  [place] to call system. Call bell, personal items and telephone within reach. Instruct patient to call for assistance. Room bathroom lighting operational. Non-slip footwear when patient is off stretcher. Physically safe environment: no spills, clutter or unnecessary equipment. Stretcher in lowest position, wheels locked, appropriate side rails in place. Provide visual cue, wrist band, yellow gown, etc. Monitor gait and stability. Monitor for mental status changes and reorient to person, place, and time. Review medications for side effects contributing to fall risk. Reinforce activity limits and safety measures with patient and family. Provide visual clues: red socks.

## 2022-09-26 NOTE — ED ADULT NURSE NOTE - NS ED NURSE LEVEL OF CONSCIOUSNESS AFFECT
Emergency Department Provider Note                   PCP:                Calli Villanueva MD               Age: 41 y.o. Sex: male       ICD-10-CM    1. Fall, subsequent encounter  W19. XXXD oxyCODONE (ROXICODONE) 5 MG immediate release tablet      2. Acute midline low back pain with left-sided sciatica  M54.42 oxyCODONE (ROXICODONE) 5 MG immediate release tablet          DISPOSITION Decision To Discharge 09/26/2022 02:41:06 PM        MDM  Number of Diagnoses or Management Options  Acute midline low back pain with left-sided sciatica: new, needed workup  Fall, subsequent encounter: new, needed workup  Diagnosis management comments: Overall well-appearing 44-year-old male who presents emergency department today with complaint of lower back pain after a fall that occurred 4 days ago. He does have tenderness to the lumbar region on exam.  He states he did have some x-rays done in urgent care yesterday but is unsure of any results because they are x-ray machine was not working properly. X-rays today are negative for acute process. No saddle paresthesia, urinary retention, or bowel incontinence to suggest cauda equina. He moves all extremities without difficulty and is ambulatory with normal, steady gait. Symptoms suggestive of sciatic type pain. We will try conservative treatments today. Nonpharmacological methods of treatment/pain relief also discussed and encouraged. I have discussed the results of all labs, procedures, radiographs, and/or treatments with the patient and available family members. Treatment plan is agreed upon by the patient and the patient is ready for discharge. Questions about treatment in the ED and differential diagnosis of presenting condition were answered. Patient was given verbal discharge instructions including, but not limited to, importance of returning to the emergency department for any concern of worsening or continued symptoms.   Instructions were given to follow-up with a primary care provider or specialist within 1 to 2 days. Adverse effects of medications, if prescribed, were discussed and the patient was advised to refrain from significant physical activity until followed up by a primary care physician and to not drive or operate heavy machinery after taking any sedating substances. Amount and/or Complexity of Data Reviewed  Tests in the radiology section of CPT®: ordered and reviewed  Tests in the medicine section of CPT®: ordered and reviewed  Review and summarize past medical records: yes  Independent visualization of images, tracings, or specimens: yes    Risk of Complications, Morbidity, and/or Mortality  Presenting problems: low  Diagnostic procedures: low  Management options: low         ED Course as of 09/26/22 1458   Mon Sep 26, 2022   1430 X-ray read. Delay in disposition for this reason.  [BC]      ED Course User Index  [BC] Vita Merchant APRN - CNP        Orders Placed This Encounter   Procedures    XR LUMBAR SPINE (2-3 VIEWS)        Medications   lidocaine 4 % external patch 1 patch (1 patch TransDERmal Patch Applied 9/26/22 1358)   dexamethasone (PF) (DECADRON) injection 8 mg (8 mg IntraMUSCular Given 9/26/22 1359)   oxyCODONE (ROXICODONE) immediate release tablet 5 mg (5 mg Oral Given 9/26/22 1359)   ibuprofen (ADVIL;MOTRIN) tablet 800 mg (800 mg Oral Given 9/26/22 1359)       Discharge Medication List as of 9/26/2022  2:43 PM        START taking these medications    Details   lidocaine 4 % external patch Place 1 patch onto the skin daily for 10 days, TransDERmal, DAILY Starting Mon 9/26/2022, Until Thu 10/6/2022, For 10 days, Disp-10 patch, R-0, Print      ibuprofen (IBU) 800 MG tablet Take 1 tablet by mouth every 8 hours as needed for Pain, Disp-30 tablet, R-0Print      methocarbamol (ROBAXIN-750) 750 MG tablet Take 1 tablet by mouth 3 times daily as needed (pain), Disp-20 tablet, R-0Print      oxyCODONE (ROXICODONE) 5 MG immediate release tablet Take 1 tablet by mouth every 8 hours as needed for Pain for up to 3 days. Intended supply: 3 days. Take lowest dose possible to manage pain, Disp-5 tablet, R-0Print      methylPREDNISolone (MEDROL, EDU,) 4 MG tablet Take by mouth as per package directions. , Disp-1 kit, R-0Print              Mike Robison is a 41 y.o. male who presents to the Emergency Department with chief complaint of    Chief Complaint   Patient presents with    Fall      44-year-old male with history of hypertension who presents emergency department today with complaint of lower back pain that radiates into the posterior left leg and stops at the calf. He states that he fell down or \"rolled down\" about 10 stairs 4 days ago. He denies any loss of consciousness. He states he was seen by urgent care and they did some x-rays but their x-ray machine kept messing up. He states he was prescribed a muscle relaxer which has not been helping. The pain is worsened with sitting and ambulation. The history is provided by the patient. Back Pain  Location:  Lumbar spine  Radiates to:  L posterior upper leg and L knee  Pain severity:  Moderate  Pain is:  Unable to specify  Onset quality:  Gradual  Duration:  4 days  Timing:  Constant  Progression:  Unchanged  Chronicity:  New  Context: falling    Relieved by:  Being still  Worsened by:  Ambulation and sitting  Ineffective treatments:  NSAIDs and muscle relaxants  Associated symptoms: leg pain    Associated symptoms: no abdominal pain, no bladder incontinence, no bowel incontinence, no chest pain, no dysuria, no fever, no numbness, no paresthesias, no pelvic pain, no perianal numbness, no tingling and no weakness        Review of Systems   Constitutional:  Negative for fever. Cardiovascular:  Negative for chest pain. Gastrointestinal:  Negative for abdominal pain and bowel incontinence. Genitourinary:  Negative for bladder incontinence, dysuria and pelvic pain.    Musculoskeletal:  Positive for back pain. Negative for neck pain. Neurological:  Negative for tingling, weakness, numbness and paresthesias. All other systems reviewed and are negative. Past Medical History:   Diagnosis Date    Hypertension     Stroke [facility])         Past Surgical History:   Procedure Laterality Date    JOINT REPLACEMENT      JOINT REPLACEMENT      TOTAL HIP ARTHROPLASTY          No family history on file. Social History     Socioeconomic History    Marital status:    Tobacco Use    Smoking status: Never    Smokeless tobacco: Never   Substance and Sexual Activity    Alcohol use: Never    Drug use: Never         Reglan [metoclopramide]     Discharge Medication List as of 9/26/2022  2:43 PM        CONTINUE these medications which have NOT CHANGED    Details   dicyclomine (BENTYL) 10 MG capsule Take 1 capsule by mouth 3 times daily as needed (Abdominal pain), Disp-12 capsule, R-1Print      hydroCHLOROthiazide (HYDRODIURIL) 25 MG tablet Take 25 mg by mouth dailyHistorical Med      amLODIPine (NORVASC) 10 MG tablet Take 10 mg by mouth dailyHistorical Med              Vitals signs and nursing note reviewed. Patient Vitals for the past 4 hrs:   Temp Pulse Resp BP SpO2   09/26/22 1449 98.6 °F (37 °C) 71 16 (!) 138/98 98 %   09/26/22 1310 98.6 °F (37 °C) 74 18 (!) 142/112 97 %          Physical Exam  Vitals and nursing note reviewed. Constitutional:       General: He is not in acute distress. Appearance: Normal appearance. He is not ill-appearing, toxic-appearing or diaphoretic. HENT:      Head: Normocephalic and atraumatic. Right Ear: External ear normal.      Left Ear: External ear normal.      Nose: Nose normal.   Eyes:      Extraocular Movements: Extraocular movements intact. Conjunctiva/sclera: Conjunctivae normal.   Cardiovascular:      Rate and Rhythm: Normal rate. Pulses:           Dorsalis pedis pulses are 2+ on the right side and 2+ on the left side.    Pulmonary:      Effort: Pulmonary arthroplasty. Surgical clips right upper quadrant,  likely cholecystectomy. Impression    Minimal spondylosis          XR LUMBAR SPINE (2-3 VIEWS)   Final Result   Minimal spondylosis                             Voice dictation software was used during the making of this note. This software is not perfect and grammatical and other typographical errors may be present. This note has not been completely proofread for errors.        Minh Smith, EVELINA - [place]  09/26/22 7831 Calm

## 2022-10-13 ENCOUNTER — NON-APPOINTMENT (OUTPATIENT)
Age: 62
End: 2022-10-13

## 2022-10-27 ENCOUNTER — APPOINTMENT (OUTPATIENT)
Dept: ORTHOPEDIC SURGERY | Facility: CLINIC | Age: 62
End: 2022-10-27

## 2022-11-10 ENCOUNTER — APPOINTMENT (OUTPATIENT)
Dept: UROLOGY | Facility: CLINIC | Age: 62
End: 2022-11-10

## 2022-11-23 ENCOUNTER — APPOINTMENT (OUTPATIENT)
Dept: PAIN MANAGEMENT | Facility: CLINIC | Age: 62
End: 2022-11-23

## 2022-11-24 ENCOUNTER — APPOINTMENT (OUTPATIENT)
Dept: PAIN MANAGEMENT | Facility: CLINIC | Age: 62
End: 2022-11-24

## 2022-12-09 ENCOUNTER — APPOINTMENT (OUTPATIENT)
Dept: ORTHOPEDIC SURGERY | Facility: CLINIC | Age: 62
End: 2022-12-09

## 2022-12-09 PROCEDURE — 99213 OFFICE O/P EST LOW 20 MIN: CPT

## 2022-12-09 PROCEDURE — 99072 ADDL SUPL MATRL&STAF TM PHE: CPT

## 2022-12-09 RX ORDER — MELOXICAM 15 MG/1
15 TABLET ORAL
Qty: 30 | Refills: 1 | Status: ACTIVE | COMMUNITY
Start: 2022-12-09 | End: 1900-01-01

## 2022-12-09 NOTE — HISTORY OF PRESENT ILLNESS
[de-identified] : 03/09/2022  Surgery - The patient was taken to the OR today. He had a right sh\par tear, impingement, AC joint arthritis, adhesions.\par PROCEDURE PERFORMED:\par 1. Right shoulder arthroscopic extensive debridement of rotator cuff, labrum, \par (75404.59).\par 2. Right shoulder arthroscopic subacromial decompression (26755.59).\par 3. Right shoulder arthroscopic distal clavicle excision (49053.59)\par 4. Right shoulder arthroscopic lysis of adhesions (52287.59).\par \par Pt is s/p right shoulder arthroscopy\par Doing well.\par Pain controlled\par \par PT has been discontinued\par \par NAD\par Right shoulder\par Incisions healed\par Mild diffuse TTP\par Compartments soft and NT\par ff 0-150\par er 40\par ir l1\par NVI\par \par s/p right shoulder arthroscopy\par went over the surgery in detail\par cont pt, needs to improve rom, new script provided, this is medically necessary\par hep and pt\par pain control\par fu in 3 months\par not working with temp total disability

## 2022-12-12 ENCOUNTER — FORM ENCOUNTER (OUTPATIENT)
Age: 62
End: 2022-12-12

## 2022-12-27 NOTE — H&P PST ADULT - NSANTHGENDERRD_ENT_A_CORE
Topical Sulfur Applications Pregnancy And Lactation Text: This medication is considered safe during pregnancy and breast feeding secondary to limited systemic absorption. Yes

## 2023-01-12 ENCOUNTER — APPOINTMENT (OUTPATIENT)
Dept: SURGERY | Facility: CLINIC | Age: 63
End: 2023-01-12
Payer: COMMERCIAL

## 2023-01-12 PROCEDURE — 99214 OFFICE O/P EST MOD 30 MIN: CPT

## 2023-01-13 NOTE — ASSESSMENT
[FreeTextEntry1] : 62M with transsphincteric anal fistula\par \par I discussed the pathology of anal fistula with the patient. I recommended examination of the anorectum under anesthesia possible fistulotomy, possible seton placement.  We discussed that the amount of sphincter muscle included in the fistula would dictate which procedure would be performed.  We discussed all risks benefits and alternatives including bleeding, infection, recurrence and possible sphincter injury leading to a degree of incontinence to gas or stool.  Patient expressed understanding and was in agreement with the plan.\par

## 2023-01-13 NOTE — PHYSICAL EXAM
[Abdomen Masses] : No abdominal masses [Abdomen Tenderness] : ~T No ~M abdominal tenderness [No HSM] : no hepatosplenomegaly [Excoriation] : no perianal excoriation [Fistula] : a fistula [Wart] : no warts [Ulcer ___ cm] : no ulcers [Pilonidal Cyst] : no pilonidal cysts [Tender, Swollen] : nontender, non-swollen [Thrombosed] : that was not thrombosed [Skin Tags] : there were no residual hemorrhoidal skin tags seen [Normal] : was normal [None] : there was no rectal mass  [Respiratory Effort] : normal respiratory effort [Normal Rate and Rhythm] : normal rate and rhythm [de-identified] : external examination shows a chronic external opening draining purulence in the left lateral location [de-identified] : awake, alert and in no acute distress

## 2023-01-13 NOTE — HISTORY OF PRESENT ILLNESS
[FreeTextEntry1] : Patient is 62M with PMH of colon cancer s/p right hemicolectomy at [facility] in 2010 who presents for follow up of anal fistula.  Patient had the abscess drained twice in 2019. PT had MRI completed which was positive for complex fistula 8/2/2022. He continues to have pain and drainage requiring him to keep a gauze or tissue in his underwear.  He has regular BM's. Patient denies fevers, chills, nausea, vomiting, abdominal pain, constipation, diarrhea, blood in his stool or unexpected weight loss.  Patient denies a family history of colon cancer rectal cancer or inflammatory bowel disease.  Lasts colonoscopy with MD Naranjo 6/2022 that showed hemorrhoids, diverticulosis and normal anastomosis. PT presents today requesting to schedule surgery.\par \par \par \par

## 2023-01-19 ENCOUNTER — NON-APPOINTMENT (OUTPATIENT)
Age: 63
End: 2023-01-19

## 2023-01-19 NOTE — REASON FOR VISIT
[Initial Consultation] : an initial pain management consultation [Follow-Up Visit] : a follow-up pain management visit [FreeTextEntry2] : lower back pain Clofazimine Counseling:  I discussed with the patient the risks of clofazimine including but not limited to skin and eye pigmentation, liver damage, nausea/vomiting, gastrointestinal bleeding and allergy.

## 2023-01-25 ENCOUNTER — APPOINTMENT (OUTPATIENT)
Dept: PAIN MANAGEMENT | Facility: CLINIC | Age: 63
End: 2023-01-25
Payer: OTHER MISCELLANEOUS

## 2023-01-25 VITALS
BODY MASS INDEX: 28.66 KG/M2 | WEIGHT: 172 LBS | HEIGHT: 65 IN | SYSTOLIC BLOOD PRESSURE: 187 MMHG | HEART RATE: 85 BPM | DIASTOLIC BLOOD PRESSURE: 136 MMHG

## 2023-01-25 DIAGNOSIS — M51.16 INTERVERTEBRAL DISC DISORDERS WITH RADICULOPATHY, LUMBAR REGION: ICD-10-CM

## 2023-01-25 DIAGNOSIS — M54.50 LOW BACK PAIN, UNSPECIFIED: ICD-10-CM

## 2023-01-25 DIAGNOSIS — M54.16 RADICULOPATHY, LUMBAR REGION: ICD-10-CM

## 2023-01-25 PROCEDURE — 99214 OFFICE O/P EST MOD 30 MIN: CPT

## 2023-01-25 PROCEDURE — 99072 ADDL SUPL MATRL&STAF TM PHE: CPT

## 2023-01-25 RX ORDER — DICLOFENAC SODIUM 75 MG/1
75 TABLET, DELAYED RELEASE ORAL
Qty: 60 | Refills: 1 | Status: ACTIVE | COMMUNITY
Start: 2022-08-24 | End: 1900-01-01

## 2023-01-25 NOTE — DATA REVIEWED
[FreeTextEntry1] :  MRI of the lumbar spine dated 09/15/2021, shows diffuse disc bulge at L4-5 with moderate to marked bilateral foraminal compromise.  Diffuse disc bulge at L3-4 with moderate left and mild right foraminal compromise.  Mild broad-based left-sided disc protrusion at L2-3 with mild left foraminal compromise.  Probable bilateral renal cysts, the largest approximating 6 cm on the left.  Correlate with renal sonography.\par \par \par SOAPP: low on 1/25/23\par Low risk: Patient has combination of a low risk SOAP and no risk factors. UDS would be repeated randomly every quarter.\par \par [place] REGISTRY: No data. \par  \par UDS: None collected.\par 
Not applicable

## 2023-01-25 NOTE — HISTORY OF PRESENT ILLNESS
[FreeTextEntry1] : ORIGINAL PRESENTATION: Mr. Goff is a 62 year-old male referred by Dr. Mckeon presenting today with a chief complaint of lower back pain. Patient's pain precipitated by work related injury occurring on July 13, 2020. 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 Patient states he injured his back while bending to assist a client. Patient states his lower back pain is mainly localized. He denies any radicular features or numbness and tingling. He initially trialed physical therapy with some moderate relief. Following the incident he was out of work for 2 weeks. He has since returned to work full duty.\par \par The patient has had this pain for 6 months. Patient describes pain as severe, 8/10 on the pain scale. During the last month the pain has been nearly constant with symptoms worsening in no typical pattern. Pain described as sharp, pressure-like. Pain is increased with lying down, standing, sitting, walking. Bowel or bladder habits have not changed.\par \par ACTIVITIES: Patient can sit many hours before pain starts. The patient constantly lies down because of pain. Patient uses no assistive walking device at this time. Patient has difficulty going to work at this time.\par \par PRIOR PAIN TREATMENTS: Moderate relief with physical therapy, Tens, heat treatment.\par \par PRIOR PAIN MEDICATIONS: None.\par \par \par PATIENT PRESENTS FOR FOLLOW UP: He is under our care for lower back pain, right shoulder pain that was precipitated by a work related injury. He was last seen on 8/24/22, and he states his back pain has not changed. The pain continues to radiate into the lower extremities. He wears a back brace as a means of relief. He continues to take Diclofenac which provides some relief.  We previously submitted for a caudal RAMA w/ MAC which was lost to the authorization process. He is interested in resubmitting for the injection at this time. \par \par Of note, he underwent right shoulder surgery with Dr. Mckeon in March 2022 which provided him some relief.

## 2023-01-25 NOTE — ASSESSMENT
[FreeTextEntry1] : Mr. Goff is a 62-year-old gentleman with a chief complaint of lower back pain radiating into the lower extremities precipitated by a work related injury occurring on 07/13/2020. He was last seen on 8/24/22 and we submitted for a for a caudal RAMA at that time which he never underwent. He states his back pain continues to be severe. We will resubmit for a caudal RAMA x1 w/ mac at his request and follow up in 4 weeks. In the interim, I will refill his Diclofenac. All this patients questions were answered and the conversation was understood well.\par \par \par Medications were given at today's visit.\par \par Patient had a MRI that shows a radicular component along with pain referred into the lower extremity. Patient has trialed rehab (Home exercise, physical therapy or chiropractic care) and medications I will schedule a Caudal 1-3 depending on effectiveness.\par \par The patient has severe anxiety of procedures that necessitates monitored anesthesia care (MAC). The procedure performed will be close to major nerves, arteries, and spinal cord and/or joint structures. Due to the proximity of these structures, we need the patient to be still during the procedure. With the help of MAC, this will be safely achieved and decrease the risk of any complications.\par \par Risk, benefits, pros and cons of procedure were explained to the patient using models and diagrams and their questions were answered.\par \par I, Rachel Ly, attest that this documentation has been prepared under the direction and in the presence of Provider Brandie Keene MD.\par \par \par Thank you for allowing me to assist in the management of this patient. \par \par \par Best Regards, \par \par \par Brandie Keene M.D., FAAPMR\par \par \par Diplomate, American Board of Physical Medicine and Rehabilitation\par Diplomate, American Board of Pain Medicine \par Diplomate, American Board of Pain Management\par \par \par \par \par

## 2023-01-25 NOTE — PHYSICAL EXAM
[Normal Coordination] : normal coordination [Normal DTR UE/LE] : normal DTR UE/LE  [Normal Sensation] : normal sensation [Orientated] : orientated [Able to Communicate] : able to communicate [Well Developed] : well developed [Well Nourished] : well nourished [Flexion] : flexion [Extension] : extension [] : clonus not sustained at ankle [TWNoteComboBox7] : forward flexion 75 degrees [de-identified] : extension 30 degrees

## 2023-01-27 ENCOUNTER — OUTPATIENT (OUTPATIENT)
Dept: OUTPATIENT SERVICES | Facility: HOSPITAL | Age: 63
LOS: 1 days | Discharge: HOME | End: 2023-01-27
Payer: COMMERCIAL

## 2023-01-27 ENCOUNTER — RESULT REVIEW (OUTPATIENT)
Age: 63
End: 2023-01-27

## 2023-01-27 VITALS
WEIGHT: 179.9 LBS | RESPIRATION RATE: 16 BRPM | OXYGEN SATURATION: 99 % | DIASTOLIC BLOOD PRESSURE: 78 MMHG | HEIGHT: 65 IN | HEART RATE: 72 BPM | SYSTOLIC BLOOD PRESSURE: 147 MMHG | TEMPERATURE: 98 F

## 2023-01-27 DIAGNOSIS — Z01.818 ENCOUNTER FOR OTHER PREPROCEDURAL EXAMINATION: ICD-10-CM

## 2023-01-27 DIAGNOSIS — Z98.890 OTHER SPECIFIED POSTPROCEDURAL STATES: Chronic | ICD-10-CM

## 2023-01-27 DIAGNOSIS — K60.3 ANAL FISTULA: ICD-10-CM

## 2023-01-27 LAB
ALBUMIN SERPL ELPH-MCNC: 4.3 G/DL — SIGNIFICANT CHANGE UP (ref 3.5–5.2)
ALP SERPL-CCNC: 58 U/L — SIGNIFICANT CHANGE UP (ref 30–115)
ALT FLD-CCNC: 19 U/L — SIGNIFICANT CHANGE UP (ref 0–41)
ANION GAP SERPL CALC-SCNC: 10 MMOL/L — SIGNIFICANT CHANGE UP (ref 7–14)
APTT BLD: 28 SEC — SIGNIFICANT CHANGE UP (ref 27–39.2)
AST SERPL-CCNC: 22 U/L — SIGNIFICANT CHANGE UP (ref 0–41)
BASOPHILS # BLD AUTO: 0.03 K/UL — SIGNIFICANT CHANGE UP (ref 0–0.2)
BASOPHILS NFR BLD AUTO: 0.5 % — SIGNIFICANT CHANGE UP (ref 0–1)
BILIRUB SERPL-MCNC: 0.2 MG/DL — SIGNIFICANT CHANGE UP (ref 0.2–1.2)
BUN SERPL-MCNC: 19 MG/DL — SIGNIFICANT CHANGE UP (ref 10–20)
CALCIUM SERPL-MCNC: 9.4 MG/DL — SIGNIFICANT CHANGE UP (ref 8.4–10.5)
CHLORIDE SERPL-SCNC: 104 MMOL/L — SIGNIFICANT CHANGE UP (ref 98–110)
CO2 SERPL-SCNC: 27 MMOL/L — SIGNIFICANT CHANGE UP (ref 17–32)
CREAT SERPL-MCNC: 1.4 MG/DL — SIGNIFICANT CHANGE UP (ref 0.7–1.5)
EGFR: 58 ML/MIN/1.73M2 — LOW
EOSINOPHIL # BLD AUTO: 0.12 K/UL — SIGNIFICANT CHANGE UP (ref 0–0.7)
EOSINOPHIL NFR BLD AUTO: 2.1 % — SIGNIFICANT CHANGE UP (ref 0–8)
GLUCOSE SERPL-MCNC: 101 MG/DL — HIGH (ref 70–99)
HCT VFR BLD CALC: 42 % — SIGNIFICANT CHANGE UP (ref 42–52)
HGB BLD-MCNC: 13.7 G/DL — LOW (ref 14–18)
IMM GRANULOCYTES NFR BLD AUTO: 0.2 % — SIGNIFICANT CHANGE UP (ref 0.1–0.3)
INR BLD: 1.05 RATIO — SIGNIFICANT CHANGE UP (ref 0.65–1.3)
LYMPHOCYTES # BLD AUTO: 1.33 K/UL — SIGNIFICANT CHANGE UP (ref 1.2–3.4)
LYMPHOCYTES # BLD AUTO: 23.7 % — SIGNIFICANT CHANGE UP (ref 20.5–51.1)
MCHC RBC-ENTMCNC: 26.3 PG — LOW (ref 27–31)
MCHC RBC-ENTMCNC: 32.6 G/DL — SIGNIFICANT CHANGE UP (ref 32–37)
MCV RBC AUTO: 80.6 FL — SIGNIFICANT CHANGE UP (ref 80–94)
MONOCYTES # BLD AUTO: 0.56 K/UL — SIGNIFICANT CHANGE UP (ref 0.1–0.6)
MONOCYTES NFR BLD AUTO: 10 % — HIGH (ref 1.7–9.3)
NEUTROPHILS # BLD AUTO: 3.56 K/UL — SIGNIFICANT CHANGE UP (ref 1.4–6.5)
NEUTROPHILS NFR BLD AUTO: 63.5 % — SIGNIFICANT CHANGE UP (ref 42.2–75.2)
NRBC # BLD: 0 /100 WBCS — SIGNIFICANT CHANGE UP (ref 0–0)
PLATELET # BLD AUTO: 175 K/UL — SIGNIFICANT CHANGE UP (ref 130–400)
POTASSIUM SERPL-MCNC: 4.1 MMOL/L — SIGNIFICANT CHANGE UP (ref 3.5–5)
POTASSIUM SERPL-SCNC: 4.1 MMOL/L — SIGNIFICANT CHANGE UP (ref 3.5–5)
PROT SERPL-MCNC: 6.6 G/DL — SIGNIFICANT CHANGE UP (ref 6–8)
PROTHROM AB SERPL-ACNC: 12 SEC — SIGNIFICANT CHANGE UP (ref 9.95–12.87)
RBC # BLD: 5.21 M/UL — SIGNIFICANT CHANGE UP (ref 4.7–6.1)
RBC # FLD: 14.1 % — SIGNIFICANT CHANGE UP (ref 11.5–14.5)
SODIUM SERPL-SCNC: 141 MMOL/L — SIGNIFICANT CHANGE UP (ref 135–146)
WBC # BLD: 5.61 K/UL — SIGNIFICANT CHANGE UP (ref 4.8–10.8)
WBC # FLD AUTO: 5.61 K/UL — SIGNIFICANT CHANGE UP (ref 4.8–10.8)

## 2023-01-27 PROCEDURE — 93010 ELECTROCARDIOGRAM REPORT: CPT

## 2023-01-27 PROCEDURE — 71046 X-RAY EXAM CHEST 2 VIEWS: CPT | Mod: 26

## 2023-01-27 NOTE — H&P PST ADULT - REASON FOR ADMISSION
Patient is a ___62__ year old ___male presenting to PAST in preparation for _examination of the anorectum under anesthesia possible seton placement possible fistulotomy_____ on ______ under _LSB______ anesthesia by  _Susan________ .

## 2023-01-27 NOTE — H&P PST ADULT - HISTORY OF PRESENT ILLNESS
CC:    Reed:    FOS:    Anesthesia Alert  NO--Difficult Airway  NO--History of neck surgery or radiation  NO--Limited ROM of neck  NO--History of Malignant hyperthermia  NO--Personal or family history of Pseudocholinesterase deficiency.  NO--Prior Anesthesia Complication  NO--Latex Allergy  NO--Loose teeth  NO--History of Rheumatoid Arthritis  NO--STEPHANIA  NO--Bleeding risk  NO--Other_____   CC: PT has a long HX of hemorroids; he had three abcess drained in past couple of years; MRI showed complex fistula; he's having a discomfort and slight bleeding; pt had a colonoscopy june 2022 which showed hemorroids, diverticulosis and anasthomosis; he has a HX of colon cancer     Mallamapti: 3    FOS: 1    Anesthesia Alert  NO--Difficult Airway  NO--History of neck surgery or radiation  NO--Limited ROM of neck  NO--History of Malignant hyperthermia  NO--Personal or family history of Pseudocholinesterase deficiency.  NO--Prior Anesthesia Complication  NO--Latex Allergy  NO--Loose teeth  NO--History of Rheumatoid Arthritis  NO--STEPHANIA  NO--Bleeding risk  NO--Other_____

## 2023-01-27 NOTE — H&P PST ADULT - NSICDXPASTMEDICALHX_GEN_ALL_CORE_FT
PAST MEDICAL HISTORY:  BPH (benign prostatic hyperplasia)     History of hemorrhoids     Left nephrolithiasis     Lumbar radiculopathy     Microscopic hematuria     Shoulder pain

## 2023-02-02 ENCOUNTER — FORM ENCOUNTER (OUTPATIENT)
Age: 63
End: 2023-02-02

## 2023-02-10 PROBLEM — Z87.19 PERSONAL HISTORY OF OTHER DISEASES OF THE DIGESTIVE SYSTEM: Chronic | Status: ACTIVE | Noted: 2023-01-27

## 2023-02-10 PROBLEM — M25.519 PAIN IN UNSPECIFIED SHOULDER: Chronic | Status: ACTIVE | Noted: 2023-01-27

## 2023-02-10 PROBLEM — M54.16 RADICULOPATHY, LUMBAR REGION: Chronic | Status: ACTIVE | Noted: 2023-01-27

## 2023-02-14 ENCOUNTER — LABORATORY RESULT (OUTPATIENT)
Age: 63
End: 2023-02-14

## 2023-02-17 ENCOUNTER — APPOINTMENT (OUTPATIENT)
Dept: SURGERY | Facility: AMBULATORY SURGERY CENTER | Age: 63
End: 2023-02-17

## 2023-02-28 ENCOUNTER — APPOINTMENT (OUTPATIENT)
Dept: SURGERY | Facility: CLINIC | Age: 63
End: 2023-02-28
Payer: COMMERCIAL

## 2023-02-28 PROCEDURE — 99213 OFFICE O/P EST LOW 20 MIN: CPT

## 2023-03-01 ENCOUNTER — APPOINTMENT (OUTPATIENT)
Dept: SURGERY | Facility: CLINIC | Age: 63
End: 2023-03-01

## 2023-03-07 NOTE — PHYSICAL EXAM
[No HSM] : no hepatosplenomegaly [Respiratory Effort] : normal respiratory effort [Normal Rate and Rhythm] : normal rate and rhythm [Abdomen Masses] : No abdominal masses [Abdomen Tenderness] : ~T No ~M abdominal tenderness [de-identified] : external examination shows a chronic external opening draining purulence in the left lateral location. No anal fissures seen. Perianal Skin: a fistula, but no perianal excoriation, no warts, no ulcers and no pilonidal cysts. Nontender, non-swollen internal hemorrhoid. Nontender, non-swollen external hemorrhoid that was not thrombosed . there were no residual hemorrhoidal skin tags seen. The sphincter tone was normal. There was no rectal tenderness present. There was no rectal abscess. There was no rectal mass. [de-identified] : awake, alert and in no acute distress.

## 2023-03-07 NOTE — HISTORY OF PRESENT ILLNESS
[FreeTextEntry1] : Patient is 62M with PMH of colon cancer s/p right hemicolectomy at [facility] in 2010 who presents for follow up of anal fistula. Patient had the abscess drained twice in 2019. PT had MRI completed which was positive for complex fistula 8/2/2022. He continues to have pain and drainage requiring him to keep a gauze in his underwear. He has regular BM's. Patient was scheduled for a surgery; however, he cancelled due to wanting to discuss other options for intervention. He presents today to discuss his case. Patient denies fevers, chills, nausea, vomiting, abdominal pain, constipation, diarrhea, blood in his stool or unexpected weight loss. Patient denies a family history of colon cancer rectal cancer or inflammatory bowel disease. Lasts colonoscopy with MD Naranjo 6/2022 that showed hemorrhoids, diverticulosis and normal anastomosis. PT presents today requesting to schedule surgery.\[place]

## 2023-03-07 NOTE — ASSESSMENT
[FreeTextEntry1] : 62-year-old male with Trans-sphincteric Anal Fistula.\par \par I described again the procedure offered to treat High Trans-sphincteric Fistulas. We discussed examination under anesthesia with Seton placement followed by a 3 month waiting period and subsequent ligation of inter-sphincteric fistula tract. The procedure was explained in details with pictures shown. Patient would like to think about surgery and discuss it with his family, he will contact us if you would like to proceed.

## 2023-03-07 NOTE — ADDENDUM
[FreeTextEntry1] : I, Laura Nuno assisted in documentation on 02/28/2023 acting as a scribe for Dr. Joseph Davis.\par

## 2023-03-09 ENCOUNTER — FORM ENCOUNTER (OUTPATIENT)
Age: 63
End: 2023-03-09

## 2023-03-09 ENCOUNTER — APPOINTMENT (OUTPATIENT)
Dept: ORTHOPEDIC SURGERY | Facility: CLINIC | Age: 63
End: 2023-03-09
Payer: OTHER MISCELLANEOUS

## 2023-03-09 PROCEDURE — 99213 OFFICE O/P EST LOW 20 MIN: CPT | Mod: ACP

## 2023-03-09 PROCEDURE — 99072 ADDL SUPL MATRL&STAF TM PHE: CPT

## 2023-03-09 RX ORDER — MELOXICAM 15 MG/1
15 TABLET ORAL
Qty: 30 | Refills: 1 | Status: ACTIVE | COMMUNITY
Start: 2023-03-09 | End: 1900-01-01

## 2023-03-09 NOTE — DISCUSSION/SUMMARY
[de-identified] : Mobic 15 mg PO QD PRN was sent to patient's pharmacy to help alleviate their symptoms.  The patient was advised to rest/ice the area.  They may alternate with warm compresses as needed.  Will re– request formal PT.  A script for physical therapy was printed for the patient so they can get started on that.\par \par He kindly declined cortisone injections today.  The shoulder and knee conditioning program from the [facility] was given to the patient so they may try that at home.  It is currently not working with mild temporary total disability.  He stated that he had to resign from his current job.  \par \par The patient will follow-up in 6-8 weeks for further evaluation.  All of the patient's questions/concerns were answered in detail.\par \par The patient was seen under the supervision of Dr. Mckeon.

## 2023-03-09 NOTE — PHYSICAL EXAM
[Sitting] : sitting [de-identified] : There is decent strength [TWNoteComboBox4] : passive forward flexion 150 degrees [TWNoteComboBox9] : passive abduction 150 degrees [TWNoteComboBox6] : internal rotation L5 [de-identified] : external rotation 90 degrees [Right] : right knee [5___] : hamstring 5[unfilled]/5 [Negative] : negative Katherine's [] : non-antalgic [TWNoteComboBox7] : flexion 100 degrees [de-identified] : extension 0 degrees

## 2023-03-09 NOTE — HISTORY OF PRESENT ILLNESS
[de-identified] : Patient is a 62-year-old male who reports to the office for reevaluation of his right knee and right shoulder pain.  He is currently not working and had to resign from his job.  Range of motion and palpating certain areas of the knee and shoulder aggravate the patient's pain.  Has history of right shoulder arthroscopy done in March 2022.  He is still awaiting authorization for physical therapy from his Worker's Compensation case.  Denies any numbness or tingling.

## 2023-03-16 ENCOUNTER — NON-APPOINTMENT (OUTPATIENT)
Age: 63
End: 2023-03-16

## 2023-04-21 ENCOUNTER — OUTPATIENT (OUTPATIENT)
Dept: OUTPATIENT SERVICES | Facility: HOSPITAL | Age: 63
LOS: 1 days | End: 2023-04-21
Payer: COMMERCIAL

## 2023-04-21 VITALS
TEMPERATURE: 97 F | HEIGHT: 67 IN | SYSTOLIC BLOOD PRESSURE: 170 MMHG | RESPIRATION RATE: 16 BRPM | WEIGHT: 181 LBS | OXYGEN SATURATION: 100 % | HEART RATE: 71 BPM | DIASTOLIC BLOOD PRESSURE: 100 MMHG

## 2023-04-21 DIAGNOSIS — Z98.890 OTHER SPECIFIED POSTPROCEDURAL STATES: Chronic | ICD-10-CM

## 2023-04-21 DIAGNOSIS — K60.3 ANAL FISTULA: ICD-10-CM

## 2023-04-21 DIAGNOSIS — Z01.818 ENCOUNTER FOR OTHER PREPROCEDURAL EXAMINATION: ICD-10-CM

## 2023-04-21 LAB
ALBUMIN SERPL ELPH-MCNC: 4.6 G/DL — SIGNIFICANT CHANGE UP (ref 3.5–5.2)
ALP SERPL-CCNC: 67 U/L — SIGNIFICANT CHANGE UP (ref 30–115)
ALT FLD-CCNC: 22 U/L — SIGNIFICANT CHANGE UP (ref 0–41)
ANION GAP SERPL CALC-SCNC: 10 MMOL/L — SIGNIFICANT CHANGE UP (ref 7–14)
APTT BLD: 28.1 SEC — SIGNIFICANT CHANGE UP (ref 27–39.2)
AST SERPL-CCNC: 23 U/L — SIGNIFICANT CHANGE UP (ref 0–41)
BASOPHILS # BLD AUTO: 0.05 K/UL — SIGNIFICANT CHANGE UP (ref 0–0.2)
BASOPHILS NFR BLD AUTO: 0.8 % — SIGNIFICANT CHANGE UP (ref 0–1)
BILIRUB SERPL-MCNC: 0.3 MG/DL — SIGNIFICANT CHANGE UP (ref 0.2–1.2)
BUN SERPL-MCNC: 15 MG/DL — SIGNIFICANT CHANGE UP (ref 10–20)
CALCIUM SERPL-MCNC: 10 MG/DL — SIGNIFICANT CHANGE UP (ref 8.4–10.5)
CHLORIDE SERPL-SCNC: 102 MMOL/L — SIGNIFICANT CHANGE UP (ref 98–110)
CO2 SERPL-SCNC: 27 MMOL/L — SIGNIFICANT CHANGE UP (ref 17–32)
CREAT SERPL-MCNC: 1.2 MG/DL — SIGNIFICANT CHANGE UP (ref 0.7–1.5)
EGFR: 69 ML/MIN/1.73M2 — SIGNIFICANT CHANGE UP
EOSINOPHIL # BLD AUTO: 0.09 K/UL — SIGNIFICANT CHANGE UP (ref 0–0.7)
EOSINOPHIL NFR BLD AUTO: 1.4 % — SIGNIFICANT CHANGE UP (ref 0–8)
GLUCOSE SERPL-MCNC: 77 MG/DL — SIGNIFICANT CHANGE UP (ref 70–99)
HCT VFR BLD CALC: 45.3 % — SIGNIFICANT CHANGE UP (ref 42–52)
HGB BLD-MCNC: 14.5 G/DL — SIGNIFICANT CHANGE UP (ref 14–18)
IMM GRANULOCYTES NFR BLD AUTO: 0.2 % — SIGNIFICANT CHANGE UP (ref 0.1–0.3)
INR BLD: 0.97 RATIO — SIGNIFICANT CHANGE UP (ref 0.65–1.3)
LYMPHOCYTES # BLD AUTO: 2.05 K/UL — SIGNIFICANT CHANGE UP (ref 1.2–3.4)
LYMPHOCYTES # BLD AUTO: 32.3 % — SIGNIFICANT CHANGE UP (ref 20.5–51.1)
MCHC RBC-ENTMCNC: 26.7 PG — LOW (ref 27–31)
MCHC RBC-ENTMCNC: 32 G/DL — SIGNIFICANT CHANGE UP (ref 32–37)
MCV RBC AUTO: 83.3 FL — SIGNIFICANT CHANGE UP (ref 80–94)
MONOCYTES # BLD AUTO: 0.64 K/UL — HIGH (ref 0.1–0.6)
MONOCYTES NFR BLD AUTO: 10.1 % — HIGH (ref 1.7–9.3)
NEUTROPHILS # BLD AUTO: 3.51 K/UL — SIGNIFICANT CHANGE UP (ref 1.4–6.5)
NEUTROPHILS NFR BLD AUTO: 55.2 % — SIGNIFICANT CHANGE UP (ref 42.2–75.2)
NRBC # BLD: 0 /100 WBCS — SIGNIFICANT CHANGE UP (ref 0–0)
PLATELET # BLD AUTO: 180 K/UL — SIGNIFICANT CHANGE UP (ref 130–400)
PMV BLD: 10.4 FL — SIGNIFICANT CHANGE UP (ref 7.4–10.4)
POTASSIUM SERPL-MCNC: 4.5 MMOL/L — SIGNIFICANT CHANGE UP (ref 3.5–5)
POTASSIUM SERPL-SCNC: 4.5 MMOL/L — SIGNIFICANT CHANGE UP (ref 3.5–5)
PROT SERPL-MCNC: 7.2 G/DL — SIGNIFICANT CHANGE UP (ref 6–8)
PROTHROM AB SERPL-ACNC: 11.1 SEC — SIGNIFICANT CHANGE UP (ref 9.95–12.87)
RBC # BLD: 5.44 M/UL — SIGNIFICANT CHANGE UP (ref 4.7–6.1)
RBC # FLD: 14.2 % — SIGNIFICANT CHANGE UP (ref 11.5–14.5)
SODIUM SERPL-SCNC: 139 MMOL/L — SIGNIFICANT CHANGE UP (ref 135–146)
WBC # BLD: 6.35 K/UL — SIGNIFICANT CHANGE UP (ref 4.8–10.8)
WBC # FLD AUTO: 6.35 K/UL — SIGNIFICANT CHANGE UP (ref 4.8–10.8)

## 2023-04-21 PROCEDURE — 80053 COMPREHEN METABOLIC PANEL: CPT

## 2023-04-21 PROCEDURE — 85610 PROTHROMBIN TIME: CPT

## 2023-04-21 PROCEDURE — 36415 COLL VENOUS BLD VENIPUNCTURE: CPT

## 2023-04-21 PROCEDURE — 85025 COMPLETE CBC W/AUTO DIFF WBC: CPT

## 2023-04-21 PROCEDURE — 85730 THROMBOPLASTIN TIME PARTIAL: CPT

## 2023-04-21 PROCEDURE — 99214 OFFICE O/P EST MOD 30 MIN: CPT | Mod: 25

## 2023-04-21 RX ORDER — ASPIRIN/CALCIUM CARB/MAGNESIUM 324 MG
1 TABLET ORAL
Qty: 0 | Refills: 0 | DISCHARGE

## 2023-04-21 RX ORDER — MELOXICAM 15 MG/1
1 TABLET ORAL
Qty: 0 | Refills: 0 | DISCHARGE

## 2023-04-21 RX ORDER — ACETAMINOPHEN 500 MG
2 TABLET ORAL
Qty: 0 | Refills: 0 | DISCHARGE

## 2023-04-21 NOTE — H&P PST ADULT - NS HP PST LATEX ALLERGY
[facility] Progress Note    Date: October 10, 2022    Name: Brien Younger    MRN: [id number]         : 1965    Ms. Varney Arrived ambulatory and in no distress for C1D1 of Zoladex  Regimen. Assessment was completed, no acute issues today. Genetic testing labs drawn. Chemotherapy Flowsheet 10/10/2022   Cycle C1D1   Date 10/10/2022   Drug / Regimen Zoladex   Notes LLQ       Ms. Varney's vitals were reviewed. Patient Vitals for the past 12 hrs:   Temp Pulse Resp BP SpO2   10/10/22 0910 98.6 °F (37 °C) 80 18 (!) 119/57 97 %           Medications:    Medications Administered       goserelin (ZOLADEX) implant 10.8 mg       Admin Date  10/10/2022 Action  Given Dose  10.8 mg Route  SubCUTAneous Administered By  Abdirziak Ortiz RN                      Ms. Elgin Koch tolerated treatment well and was discharged from [place] [address] in stable condition. She is to return on 22 for her next appointment.     Fannie Plascencia RN  October 10, 2022 No

## 2023-04-21 NOTE — H&P PST ADULT - HISTORY OF PRESENT ILLNESS
CC: pt started having a problem with anal fistula since 2019; he had two drainages done; an MRI last year showed a complex fistula; it's causing draining/small amounts of blood and discomfort; pt currently is under workman's comp for work injury; he has pain in shoulder; he had an arthroscopy last year  -he will need to see his PCP; his BP is elevated; no symptoms  -chest xray was done january 2023; in chart    Mallamapti: 4    FOS: 1    Limited ROM of neck from injury     Anesthesia Alert  NO--Difficult Airway  NO--History of neck surgery or radiation  yes--Limited ROM of neck  NO--History of Malignant hyperthermia  NO--Personal or family history of Pseudocholinesterase deficiency.  NO--Prior Anesthesia Complication  NO--Latex Allergy  NO--Loose teeth  NO--History of Rheumatoid Arthritis  NO--STEPHANIA  NO--Bleeding risk  NO--Other_____

## 2023-04-21 NOTE — H&P PST ADULT - NSICDXPASTMEDICALHX_GEN_ALL_CORE_FT
PAST MEDICAL HISTORY:  Accident at workplace     Anal fistula     BPH (benign prostatic hyperplasia)     Colon cancer     History of hemorrhoids     Left nephrolithiasis     Lumbar radiculopathy     Microscopic hematuria     Shoulder pain

## 2023-04-21 NOTE — H&P PST ADULT - BP NONINVASIVE DIASTOLIC (MM HG)
The form received does not meet Forms Completion criteria for processing.     The form was then routed to the appropriate provider pool with instructions that the doctor needs to complete, sign and send to the patient when done, and then fax or email us a copy for scanning purposes only.      
100

## 2023-04-21 NOTE — H&P PST ADULT - REASON FOR ADMISSION
Patient is a _62____ year old ___male presenting to PAST in preparation for _exam of the anorectum under anesthesia, possible seton placement, possib;e fistulotomy_____ on ___05/12/23___ under _LSB______ anesthesia by  __Susan_______ . Patient is a _62____ year old ___male presenting to PAST in preparation for _exam of the anorectum under anesthesia, possible seton placement, possible fistulotomy_____ on ___05/12/23___ under _LSB______ anesthesia by  __Susan_______ .

## 2023-04-22 DIAGNOSIS — K60.3 ANAL FISTULA: ICD-10-CM

## 2023-04-22 DIAGNOSIS — Z01.818 ENCOUNTER FOR OTHER PREPROCEDURAL EXAMINATION: ICD-10-CM

## 2023-05-04 PROBLEM — C18.9 MALIGNANT NEOPLASM OF COLON, UNSPECIFIED: Chronic | Status: ACTIVE | Noted: 2023-04-21

## 2023-05-04 PROBLEM — K60.3 ANAL FISTULA: Chronic | Status: ACTIVE | Noted: 2023-04-21
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
## 2023-05-09 NOTE — H&P PST ADULT - NSANTHBMIRD_ENT_A_CORE
23  Danielle Yarbrough : 1961 Sex: male  Age: 63 y.o. Assessment and Plan:  Aba Najera was seen today for hyperlipidemia and annual exam.    Diagnoses and all orders for this visit:     Diagnosis Orders   1. Encounter for general adult medical examination with abnormal findings  Pt will do BW ordered last year today       2. Bilateral leg pain  ibuprofen (ADVIL;MOTRIN) 600 MG tablet      3. Elevated hemoglobin ([address]  Further recommendations pending results        4. Tobacco abuse  Complete cessation encouraged       5. Mixed hyperlipidemia  Further recommendations pending results                Return in about 6 months (around 2023). Chief Complaint   Patient presents with    Hyperlipidemia    Annual Exam       HPI  Pt here for routine f/u and annual exam   Overall doing well  No major concerns/complaints for me today     Elevated hemoglobin -  Could be smoking related   Pt is overdue for repeat labs   Will do today    HLD -   Pt has previously declined statins  Will revisit once new results are in  The 10-year ASCVD risk score (Irma MAY, et al., 2019) is: 17.7%    Values used to calculate the score:      Age: 63 years      Sex: Male      Is Non- : No      Diabetic: No      Tobacco smoker: Yes      Systolic Blood Pressure: 425 mmHg      Is BP treated: No      HDL Cholesterol: 40 mg/dL      Total Cholesterol: 220 mg/dL    B/L leg pain, back pain -  Overall stable  Uses the ibuprofen very sparingly  Requesting refills    Tobacco abuse -   \"Cutting down\" but he's still smoking  Precontemplative at this time   Declines lung cancer screening     Problem list reviewed and updated in full with patient today as necessary. A comprehensive ROS was negative, except as documented above. Review of Systems   Respiratory: Negative. Cardiovascular: Negative. Gastrointestinal: Negative. Genitourinary: Negative. Psychiatric/Behavioral: Negative.          Current Outpatient
No

## 2023-05-11 ENCOUNTER — APPOINTMENT (OUTPATIENT)
Dept: ORTHOPEDIC SURGERY | Facility: CLINIC | Age: 63
End: 2023-05-11
Payer: OTHER MISCELLANEOUS

## 2023-05-11 PROCEDURE — 99213 OFFICE O/P EST LOW 20 MIN: CPT | Mod: ACP,25

## 2023-05-11 PROCEDURE — 20610 DRAIN/INJ JOINT/BURSA W/O US: CPT | Mod: RT

## 2023-05-11 RX ORDER — MELOXICAM 15 MG/1
15 TABLET ORAL
Qty: 30 | Refills: 0 | Status: ACTIVE | COMMUNITY
Start: 2023-05-11 | End: 1900-01-01

## 2023-05-11 NOTE — PROCEDURE
[Large Joint Injection] : Large joint injection [Right] : of the right [Knee] : knee [Pain] : pain [Alcohol] : alcohol [____] : [unfilled] [] : Patient tolerated procedure well [Call if redness, pain or fever occur] : call if redness, pain or fever occur [Risks, benefits, alternatives discussed / Verbal consent obtained] : the risks benefits, and alternatives have been discussed, and verbal consent was obtained

## 2023-05-12 ENCOUNTER — APPOINTMENT (OUTPATIENT)
Dept: SURGERY | Facility: AMBULATORY SURGERY CENTER | Age: 63
End: 2023-05-12

## 2023-05-21 ENCOUNTER — FORM ENCOUNTER (OUTPATIENT)
Age: 63
End: 2023-05-21

## 2023-05-23 NOTE — ADDENDUM
[FreeTextEntry1] : Right knee MRI ordered for further evaluation.  Rule out tear versus osteoarthritis.

## 2023-05-23 NOTE — IMAGING
[de-identified] : \par Examination of the right shoulder is as follows: \par Inspection: no swelling, no ecchymosis, no erythema, no atrophy, no deformity. \par Palpation: trapezius tenderness. \par ROM: active forward flexion 120 degrees, passive forward flexion 150 degrees, active abduction 125 degrees, passive abduction 150 degrees, internal rotation L5, external rotation 90 degrees, ROM is limited secondary to pain. \par Motion is assessed: sitting \par Strength: discomfort with strength testing There is decent strength. \par Tests: negative Calderon, negative [place]. \par Neuro: motor and sensory intact distally. \par \par Examination of the right knee is as follows: \par Inspection: no effusion, no ecchymosis, no erythema, no atrophy. \par Palpation: medial joint line tenderness, lateral joint line tenderness, but no calf tenderness. \par ROM: flexion 100 degrees, extension 0 degrees, ROM is limited secondary to pain. \par Strength: quadriceps 5/5, hamstring 5/5. \par Testing: negative Lachmann, negative Katherine's, ligamentously stable, able to do straight leg raise. \par Neuro: light touch is intact throughout. \par Gait: non-antalgic. \par

## 2023-05-23 NOTE — HISTORY OF PRESENT ILLNESS
[de-identified] : Patient is a 62-year-old male who reports to the office for subsequent reevaluation of his right shoulder right knee pain.  He states his physical therapy has been halted.  Range of motion palpating certain areas of the knee and shoulder aggravate the patient's pain.  Denies any numbness or tingling.

## 2023-05-23 NOTE — DISCUSSION/SUMMARY
[de-identified] : Mobic 15 mg PO QD PRN was sent to patient's pharmacy to help alleviate their symptoms.  The patient was advised to rest/ice the area and may alternate with warm compresses as needed.  The knee and shoulder conditioning program from the [facility] was given to the patient so they may try that at home.\par \par With the patient's approval, and under sterile technique, I performed a steroid injection today.  See the attached procedure note for further details.  The patient was informed that their next cortisone injection could not be until a minimum of three months from today's date and the patient understands.  Explained to the patient that the full effect of the injection will take 3-5 days to kick in. \par \par Patient is currently resigned from his job.  Right knee Synvisc 1 gel injection ordered for the patient so he may receive that at his next visit.  The patient will follow-up in 6 weeks for further evaluation.  All of the patient's questions/concerns were answered in detail.\par \par

## 2023-05-30 ENCOUNTER — APPOINTMENT (OUTPATIENT)
Dept: SURGERY | Facility: CLINIC | Age: 63
End: 2023-05-30

## 2023-06-23 ENCOUNTER — APPOINTMENT (OUTPATIENT)
Dept: ORTHOPEDIC SURGERY | Facility: CLINIC | Age: 63
End: 2023-06-23

## 2023-09-19 ENCOUNTER — APPOINTMENT (OUTPATIENT)
Dept: ORTHOPEDIC SURGERY | Facility: CLINIC | Age: 63
End: 2023-09-19
Payer: OTHER MISCELLANEOUS

## 2023-09-19 VITALS — HEIGHT: 65 IN | WEIGHT: 170 LBS | BODY MASS INDEX: 28.32 KG/M2

## 2023-09-19 DIAGNOSIS — M25.561 PAIN IN RIGHT KNEE: ICD-10-CM

## 2023-09-19 PROCEDURE — 99213 OFFICE O/P EST LOW 20 MIN: CPT

## 2023-09-22 ENCOUNTER — APPOINTMENT (OUTPATIENT)
Dept: PAIN MANAGEMENT | Facility: CLINIC | Age: 63
End: 2023-09-22

## 2023-10-06 ENCOUNTER — APPOINTMENT (OUTPATIENT)
Dept: ORTHOPEDIC SURGERY | Facility: CLINIC | Age: 63
End: 2023-10-06
Payer: OTHER MISCELLANEOUS

## 2023-10-06 PROCEDURE — 72110 X-RAY EXAM L-2 SPINE 4/>VWS: CPT

## 2023-10-06 PROCEDURE — 99214 OFFICE O/P EST MOD 30 MIN: CPT

## 2023-10-24 ENCOUNTER — APPOINTMENT (OUTPATIENT)
Dept: SURGERY | Facility: CLINIC | Age: 63
End: 2023-10-24

## 2023-11-01 ENCOUNTER — APPOINTMENT (OUTPATIENT)
Dept: SURGERY | Facility: CLINIC | Age: 63
End: 2023-11-01
Payer: COMMERCIAL

## 2023-11-01 VITALS
HEART RATE: 85 BPM | BODY MASS INDEX: 29.49 KG/M2 | SYSTOLIC BLOOD PRESSURE: 126 MMHG | HEIGHT: 65 IN | OXYGEN SATURATION: 99 % | WEIGHT: 177 LBS | DIASTOLIC BLOOD PRESSURE: 74 MMHG | TEMPERATURE: 97.6 F

## 2023-11-01 DIAGNOSIS — K60.3 ANAL FISTULA: ICD-10-CM

## 2023-11-01 PROCEDURE — 99214 OFFICE O/P EST MOD 30 MIN: CPT

## 2023-11-01 RX ORDER — AMOXICILLIN AND CLAVULANATE POTASSIUM 875; 125 MG/1; MG/1
875-125 TABLET, COATED ORAL
Qty: 14 | Refills: 0 | Status: ACTIVE | COMMUNITY
Start: 2023-11-01 | End: 1900-01-01

## 2023-11-02 PROBLEM — K60.3 ANAL FISTULA: Status: ACTIVE | Noted: 2021-12-13

## 2023-11-24 ENCOUNTER — APPOINTMENT (OUTPATIENT)
Dept: ORTHOPEDIC SURGERY | Facility: CLINIC | Age: 63
End: 2023-11-24

## 2023-12-05 ENCOUNTER — RX RENEWAL (OUTPATIENT)
Age: 63
End: 2023-12-05

## 2023-12-05 RX ORDER — MELOXICAM 15 MG/1
15 TABLET ORAL
Qty: 30 | Refills: 0 | Status: ACTIVE | COMMUNITY
Start: 2023-10-06 | End: 1900-01-01

## 2023-12-08 ENCOUNTER — APPOINTMENT (OUTPATIENT)
Dept: ORTHOPEDIC SURGERY | Facility: CLINIC | Age: 63
End: 2023-12-08
Payer: OTHER MISCELLANEOUS

## 2023-12-08 VITALS — WEIGHT: 171 LBS | BODY MASS INDEX: 28.49 KG/M2 | HEIGHT: 65 IN

## 2023-12-08 PROCEDURE — 99213 OFFICE O/P EST LOW 20 MIN: CPT

## 2023-12-08 RX ORDER — MELOXICAM 15 MG/1
15 TABLET ORAL
Qty: 30 | Refills: 1 | Status: ACTIVE | COMMUNITY
Start: 2023-10-06 | End: 1900-01-01

## 2024-01-12 ENCOUNTER — APPOINTMENT (OUTPATIENT)
Dept: ORTHOPEDIC SURGERY | Facility: CLINIC | Age: 64
End: 2024-01-12

## 2024-01-26 ENCOUNTER — EMERGENCY (EMERGENCY)
Facility: HOSPITAL | Age: 64
LOS: 0 days | Discharge: HOME | End: 2024-01-27
Attending: EMERGENCY MEDICINE
Payer: COMMERCIAL

## 2024-01-26 VITALS
RESPIRATION RATE: 18 BRPM | HEIGHT: 65 IN | HEART RATE: 67 BPM | TEMPERATURE: 98 F | WEIGHT: 166.01 LBS | DIASTOLIC BLOOD PRESSURE: 95 MMHG | OXYGEN SATURATION: 99 % | SYSTOLIC BLOOD PRESSURE: 173 MMHG

## 2024-01-26 DIAGNOSIS — R06.02 SHORTNESS OF BREATH: ICD-10-CM

## 2024-01-26 DIAGNOSIS — R07.2 PRECORDIAL PAIN: ICD-10-CM

## 2024-01-26 DIAGNOSIS — Z98.890 OTHER SPECIFIED POSTPROCEDURAL STATES: Chronic | ICD-10-CM

## 2024-01-26 DIAGNOSIS — I10 ESSENTIAL (PRIMARY) HYPERTENSION: ICD-10-CM

## 2024-01-26 DIAGNOSIS — R94.31 ABNORMAL ELECTROCARDIOGRAM [ECG] [EKG]: ICD-10-CM

## 2024-01-26 LAB
ALBUMIN SERPL ELPH-MCNC: 4.4 G/DL — SIGNIFICANT CHANGE UP (ref 3.5–5.2)
ALP SERPL-CCNC: 57 U/L — SIGNIFICANT CHANGE UP (ref 30–115)
ALT FLD-CCNC: 19 U/L — SIGNIFICANT CHANGE UP (ref 0–41)
ANION GAP SERPL CALC-SCNC: 12 MMOL/L — SIGNIFICANT CHANGE UP (ref 7–14)
AST SERPL-CCNC: 35 U/L — SIGNIFICANT CHANGE UP (ref 0–41)
BASOPHILS # BLD AUTO: 0.02 K/UL — SIGNIFICANT CHANGE UP (ref 0–0.2)
BASOPHILS NFR BLD AUTO: 0.4 % — SIGNIFICANT CHANGE UP (ref 0–1)
BILIRUB SERPL-MCNC: 0.3 MG/DL — SIGNIFICANT CHANGE UP (ref 0.2–1.2)
BUN SERPL-MCNC: 15 MG/DL — SIGNIFICANT CHANGE UP (ref 10–20)
CALCIUM SERPL-MCNC: 9.6 MG/DL — SIGNIFICANT CHANGE UP (ref 8.4–10.5)
CHLORIDE SERPL-SCNC: 101 MMOL/L — SIGNIFICANT CHANGE UP (ref 98–110)
CO2 SERPL-SCNC: 26 MMOL/L — SIGNIFICANT CHANGE UP (ref 17–32)
CREAT SERPL-MCNC: 1.4 MG/DL — SIGNIFICANT CHANGE UP (ref 0.7–1.5)
EGFR: 57 ML/MIN/1.73M2 — LOW
EOSINOPHIL # BLD AUTO: 0.09 K/UL — SIGNIFICANT CHANGE UP (ref 0–0.7)
EOSINOPHIL NFR BLD AUTO: 1.9 % — SIGNIFICANT CHANGE UP (ref 0–8)
GLUCOSE SERPL-MCNC: 73 MG/DL — SIGNIFICANT CHANGE UP (ref 70–99)
HCT VFR BLD CALC: 43.4 % — SIGNIFICANT CHANGE UP (ref 42–52)
HGB BLD-MCNC: 14.2 G/DL — SIGNIFICANT CHANGE UP (ref 14–18)
IMM GRANULOCYTES NFR BLD AUTO: 0.4 % — HIGH (ref 0.1–0.3)
LYMPHOCYTES # BLD AUTO: 1.88 K/UL — SIGNIFICANT CHANGE UP (ref 1.2–3.4)
LYMPHOCYTES # BLD AUTO: 40.7 % — SIGNIFICANT CHANGE UP (ref 20.5–51.1)
MCHC RBC-ENTMCNC: 26.5 PG — LOW (ref 27–31)
MCHC RBC-ENTMCNC: 32.7 G/DL — SIGNIFICANT CHANGE UP (ref 32–37)
MCV RBC AUTO: 81.1 FL — SIGNIFICANT CHANGE UP (ref 80–94)
MONOCYTES # BLD AUTO: 0.47 K/UL — SIGNIFICANT CHANGE UP (ref 0.1–0.6)
MONOCYTES NFR BLD AUTO: 10.2 % — HIGH (ref 1.7–9.3)
NEUTROPHILS # BLD AUTO: 2.14 K/UL — SIGNIFICANT CHANGE UP (ref 1.4–6.5)
NEUTROPHILS NFR BLD AUTO: 46.4 % — SIGNIFICANT CHANGE UP (ref 42.2–75.2)
NRBC # BLD: 0 /100 WBCS — SIGNIFICANT CHANGE UP (ref 0–0)
PLATELET # BLD AUTO: 144 K/UL — SIGNIFICANT CHANGE UP (ref 130–400)
PMV BLD: 10.6 FL — HIGH (ref 7.4–10.4)
POTASSIUM SERPL-MCNC: 5.2 MMOL/L — HIGH (ref 3.5–5)
POTASSIUM SERPL-SCNC: 5.2 MMOL/L — HIGH (ref 3.5–5)
PROT SERPL-MCNC: 7 G/DL — SIGNIFICANT CHANGE UP (ref 6–8)
RBC # BLD: 5.35 M/UL — SIGNIFICANT CHANGE UP (ref 4.7–6.1)
RBC # FLD: 14.8 % — HIGH (ref 11.5–14.5)
SODIUM SERPL-SCNC: 139 MMOL/L — SIGNIFICANT CHANGE UP (ref 135–146)
TROPONIN T, HIGH SENSITIVITY RESULT: <6 NG/L — SIGNIFICANT CHANGE UP (ref 6–21)
TROPONIN T, HIGH SENSITIVITY RESULT: <6 NG/L — SIGNIFICANT CHANGE UP (ref 6–21)
WBC # BLD: 4.62 K/UL — LOW (ref 4.8–10.8)
WBC # FLD AUTO: 4.62 K/UL — LOW (ref 4.8–10.8)

## 2024-01-26 PROCEDURE — G0378: CPT

## 2024-01-26 PROCEDURE — 85025 COMPLETE CBC W/AUTO DIFF WBC: CPT

## 2024-01-26 PROCEDURE — 80053 COMPREHEN METABOLIC PANEL: CPT

## 2024-01-26 PROCEDURE — 93010 ELECTROCARDIOGRAM REPORT: CPT | Mod: 77

## 2024-01-26 PROCEDURE — 93005 ELECTROCARDIOGRAM TRACING: CPT

## 2024-01-26 PROCEDURE — 99223 1ST HOSP IP/OBS HIGH 75: CPT

## 2024-01-26 PROCEDURE — 71045 X-RAY EXAM CHEST 1 VIEW: CPT

## 2024-01-26 PROCEDURE — 93010 ELECTROCARDIOGRAM REPORT: CPT

## 2024-01-26 PROCEDURE — 99285 EMERGENCY DEPT VISIT HI MDM: CPT | Mod: 25

## 2024-01-26 PROCEDURE — 71045 X-RAY EXAM CHEST 1 VIEW: CPT | Mod: 26

## 2024-01-26 PROCEDURE — 75574 CT ANGIO HRT W/3D IMAGE: CPT | Mod: MA

## 2024-01-26 PROCEDURE — 36415 COLL VENOUS BLD VENIPUNCTURE: CPT

## 2024-01-26 PROCEDURE — 84484 ASSAY OF TROPONIN QUANT: CPT

## 2024-01-26 RX ORDER — ASPIRIN/CALCIUM CARB/MAGNESIUM 324 MG
324 TABLET ORAL ONCE
Refills: 0 | Status: COMPLETED | OUTPATIENT
Start: 2024-01-26 | End: 2024-01-26

## 2024-01-26 RX ADMIN — Medication 324 MILLIGRAM(S): at 16:21

## 2024-01-26 NOTE — ED PROVIDER NOTE - WHICH SHOWED
Normal sinus rhythm at 60 bpm, NE interval 174, QRS 72, QTc 376, T wave inversions in leads III, aVF, V3, V4, V5, V6.  No ST elevations.    no pna, no ptx

## 2024-01-26 NOTE — ED CDU PROVIDER INITIAL DAY NOTE - CLINICAL SUMMARY MEDICAL DECISION MAKING FREE TEXT BOX
One month of chest pain. Abnormal EKG. Troponin normal. Placed into observation for CCTA and monitoring.

## 2024-01-26 NOTE — ED CDU PROVIDER INITIAL DAY NOTE - PHYSICAL EXAMINATION
CONST: Well appearing in NAD  CARD: S1 S2; No jvd  RESP: Equal BS B/L, No wheezes, rhonchi or rales. No distress  GI: Soft, non-tender, non-distended. normal BS  MS: Normal ROM in all extremities. pulses 2 +. no calf tenderness or swelling  SKIN: Warm, dry, no acute rashes. Good turgor

## 2024-01-26 NOTE — ED PROVIDER NOTE - CONSIDERATION OF ADMISSION OBSERVATION
Escalation to admission/observation was considered. Patient requires inpatient hospitalization - monitored setting. Consideration of Admission/Observation Escalation to admission/observation was considered. Patient requires inpatient hospitalization - monitored setting. agrees with plan for obs.

## 2024-01-26 NOTE — ED ADULT TRIAGE NOTE - CHIEF COMPLAINT QUOTE
Pt sent in from [facility] c/o chest pain x1 week. [place] [facility] [place]. denies SOB.

## 2024-01-26 NOTE — ED CDU PROVIDER INITIAL DAY NOTE - ATTENDING APP SHARED VISIT CONTRIBUTION OF CARE
Pt presents with chest pain which has been continuous for one month. No associated shortness of breath, nausea, vomiting, diaphoresis. NO medical history. On exam S1S2 rrr, lungs clears, no abdominal tenderness. No swelling or tenderness to the lower ext. NO rash. Troponin neg x 1. EKG with t-wave abn. Chest x-ray neg.

## 2024-01-26 NOTE — ED PROVIDER NOTE - OBJECTIVE STATEMENT
63-year-old male past medical history of hypertension presents for evaluation of chest pain.  Patient endorses burning substernal chest pain x 1 week, aggravated at night, no relieving factors.  Denies fever, headache, cough, shortness of breath, weakness, numbness, dysuria, hematuria, vomiting, diarrhea.

## 2024-01-26 NOTE — ED PROVIDER NOTE - PHYSICAL EXAMINATION
CONST: Well appearing in NAD  CARD: S1 S2; No jvd  RESP: Equal BS B/L, No wheezes, rhonchi or rales. No distress  GI: Soft, non-tender, non-distended. normal BS  MS: Normal ROM in all extremities. pulses 2 +. no calf tenderness or swelling  SKIN: Warm, dry, no acute rashes. Good turgor  NEURO: A&Ox3, No focal deficits. Strength 5/5 with no sensory deficits.

## 2024-01-26 NOTE — ED PROVIDER NOTE - OTHER FINDINGS
Normal sinus rhythm at 60 bpm, MD interval 174, QRS 72, QTc 376, T wave inversions in leads III, aVF, V3, V4, V5, V6.  No ST elevations.

## 2024-01-26 NOTE — ED PROVIDER NOTE - CLINICAL SUMMARY MEDICAL DECISION MAKING FREE TEXT BOX
63-year-old male denies any past medical history, states only takes pain medication for shoulder injury,, denies family history of cardiac disease, no family history of PE, DVT, or clotting disorders, presents with left-sided chest pain for more than 2 weeks, constant, sharp, nonradiating, no alleviating or precipitating factors, associated with shortness of breath worse on exertion.  Patient denies smoking.  States he went to an urgent care and was told to come to the emergency department.  No fever, chills, n/v, , pleuritic cp, palpitations, diaphoresis, cough, ha/lh/dizziness, numbness/tingling, neck pain/ stiffness, abd pain, diarrhea, constipation, melena/brbpr, urinary symptoms, trauma, weakness, edema, calf pain/swelling/erythema, sick contacts, recent travel or rash. vargas not have cardiologist.    on exam: non toxic appearing pt sitting on stretcher speaking full sentences, no rash, mmm, neck supple. non-tender , radial pulses 2/4 b/l, no jvd, no pain to palpation to chest wall, no pain with movement/ not reproducible, ctabl w/ breath sounds present b/l, no wheezing or crackles, no accessory muscle use, no tachypnea, no stridor, bs present throughout all 4 quadrants, abd soft, nd, nt, no rebound tenderness or guarding, no cvat, FROM of ext, no calf pain/swelling/erythema, AAOx3. motor 5/5 and sensation intact throughout upper and lower ext. no focal deficits.    Plan: Monitor, EKG, CXR, labs, aspirin, reassess.    Labs and EKG were ordered and reviewed.  Imaging was ordered and reviewed by me.  Appropriate medications for patient's presenting complaints were ordered and effects were reassessed.  Patient's records (prior hospital, ED visit) were reviewed.  Additional history was obtained from daughter. Escalation to admission/observation was considered. Patient requires inpatient hospitalization - monitored setting. 63-year-old male denies any past medical history, states only takes pain medication for shoulder injury,, denies family history of cardiac disease, no family history of PE, DVT, or clotting disorders, presents with left-sided chest pain for more than 2 weeks, constant, sharp, nonradiating, no alleviating or precipitating factors, associated with shortness of breath worse on exertion.  Patient denies smoking.  States he went to an urgent care and was told to come to the emergency department.  No fever, chills, n/v, , pleuritic cp, palpitations, diaphoresis, cough, ha/lh/dizziness, numbness/tingling, neck pain/ stiffness, abd pain, diarrhea, constipation, melena/brbpr, urinary symptoms, trauma, weakness, edema, calf pain/swelling/erythema, sick contacts, recent travel or rash. vargas not have cardiologist.    on exam: non toxic appearing pt sitting on stretcher speaking full sentences, no rash, mmm, neck supple. non-tender , radial pulses 2/4 b/l, no jvd, no pain to palpation to chest wall, no pain with movement/ not reproducible, ctabl w/ breath sounds present b/l, no wheezing or crackles, no accessory muscle use, no tachypnea, no stridor, bs present throughout all 4 quadrants, abd soft, nd, nt, no rebound tenderness or guarding, no cvat, FROM of ext, no calf pain/swelling/erythema, AAOx3. motor 5/5 and sensation intact throughout upper and lower ext. no focal deficits.    Plan: Monitor, EKG, CXR, labs, aspirin, reassess.    Labs and EKG were ordered and reviewed.  Imaging was ordered and reviewed by me.  Appropriate medications for patient's presenting complaints were ordered and effects were reassessed.  Patient's records (prior hospital, ED visit) were reviewed.  Additional history was obtained from daughter. Escalation to admission/observation was considered. Patient requires inpatient hospitalization - monitored setting. agrees with plan for obs.

## 2024-01-26 NOTE — ED ADULT NURSE REASSESSMENT NOTE - NS ED NURSE REASSESS COMMENT FT1
received handoff from RN. PT ANOx4, RR even and unlabored, IV intact, No distress noted at this time. Pt hooked up to cardiac monitor.

## 2024-01-26 NOTE — ED PROVIDER NOTE - ATTENDING APP SHARED VISIT CONTRIBUTION OF CARE
63-year-old male denies any past medical history, states only takes pain medication for shoulder injury,, denies family history of cardiac disease, no family history of PE, DVT, or clotting disorders, presents with left-sided chest pain for more than 2 weeks, constant, sharp, nonradiating, no alleviating or precipitating factors, associated with shortness of breath worse on exertion.  Patient denies smoking.  States he went to an urgent care and was told to come to the emergency department.  No fever, chills, n/v, , pleuritic cp, palpitations, diaphoresis, cough, ha/lh/dizziness, numbness/tingling, neck pain/ stiffness, abd pain, diarrhea, constipation, melena/brbpr, urinary symptoms, trauma, weakness, edema, calf pain/swelling/erythema, sick contacts, recent travel or rash. vargas not have cardiologist.    on exam: non toxic appearing pt sitting on stretcher speaking full sentences, no rash, mmm, neck supple. non-tender , radial pulses 2/4 b/l, no jvd, no pain to palpation to chest wall, no pain with movement/ not reproducible, ctabl w/ breath sounds present b/l, no wheezing or crackles, no accessory muscle use, no tachypnea, no stridor, bs present throughout all 4 quadrants, abd soft, nd, nt, no rebound tenderness or guarding, no cvat, FROM of ext, no calf pain/swelling/erythema, AAOx3. motor 5/5 and sensation intact throughout upper and lower ext. no focal deficits.    Plan: Monitor, EKG, CXR, labs, aspirin, reassess.

## 2024-01-27 VITALS
SYSTOLIC BLOOD PRESSURE: 134 MMHG | HEART RATE: 62 BPM | OXYGEN SATURATION: 99 % | DIASTOLIC BLOOD PRESSURE: 68 MMHG | RESPIRATION RATE: 18 BRPM

## 2024-01-27 PROCEDURE — 75574 CT ANGIO HRT W/3D IMAGE: CPT | Mod: 26,MA

## 2024-01-27 PROCEDURE — 99238 HOSP IP/OBS DSCHRG MGMT 30/<: CPT

## 2024-01-27 RX ORDER — ATORVASTATIN CALCIUM 80 MG/1
1 TABLET, FILM COATED ORAL
Qty: 30 | Refills: 0
Start: 2024-01-27 | End: 2024-02-25

## 2024-01-27 RX ORDER — METOPROLOL TARTRATE 50 MG
50 TABLET ORAL ONCE
Refills: 0 | Status: COMPLETED | OUTPATIENT
Start: 2024-01-27 | End: 2024-01-27

## 2024-01-27 RX ADMIN — Medication 50 MILLIGRAM(S): at 08:08

## 2024-01-27 NOTE — ED CDU PROVIDER DISPOSITION NOTE - CARE PROVIDERS DIRECT ADDRESSES
[email or web address]

## 2024-01-27 NOTE — ED ADULT NURSE NOTE - CHIEF COMPLAINT QUOTE
Pt sent in from [facility] c/o chest pain x1 week. denies SOB.

## 2024-01-27 NOTE — ED ADULT NURSE REASSESSMENT NOTE - NS ED NURSE REASSESS COMMENT FT1
Assessed pt upon my arrival, he is A+OX4 and ambulatory with steady gait. IV intact and patent. V/S stable. MD aware of hypertension.

## 2024-01-27 NOTE — ED CDU PROVIDER DISPOSITION NOTE - PROVIDER TOKENS
PROVIDER:[TOKEN:[[id number]],FOLLOWUP:[7-10 Days]],PROVIDER:[TOKEN:[[id number]],FOLLOWUP:[7-10 Days]]

## 2024-01-27 NOTE — ED CDU PROVIDER DISPOSITION NOTE - CLINICAL COURSE
Pt presents with one month of chest pain. CE neg. CCTA minimal disease. Lipitor, low chol diet, life style modification: exercise advised. Follow up cardiology and follow up gastroenterology. All reports given to pt.. Pt presents with one month of chest pain. CE neg. CCTA minimal disease. Lipitor, low chol diet, life style modification: exercise advised. Follow up cardiology and follow up gastroenterology. All reports given to pt. Discussion of reflux. Take pepcid AC and avoid all caffeine.

## 2024-01-27 NOTE — ED CDU PROVIDER DISPOSITION NOTE - PATIENT PORTAL LINK FT
You can access the FollowMyHealth Patient Portal offered by [facility] by registering at the following website: [email or web address]. By joining [employer]’s FollowMyHealth portal, you will also be able to view your health information using other applications (apps) compatible with our system.

## 2024-01-27 NOTE — ED CDU PROVIDER DISPOSITION NOTE - NSFOLLOWUPINSTRUCTIONS_ED_ALL_ED_FT
Take one baby aspirin daily: 81 mg.     Avoid diet rich in cholesterol    Take lipitor 40 mg daily to lower cholesterol    Follow up cardiology    Follow up gastroenterology.

## 2024-01-27 NOTE — ED CDU PROVIDER SUBSEQUENT DAY NOTE - PROGRESS NOTE DETAILS
All reports reviewed with pt and his two Adult. children. Minimal disease on CTA. advised low chol diet. Aspirin daily. Follow cardiology and GI. Exercise discussed.

## 2024-01-30 ENCOUNTER — NON-APPOINTMENT (OUTPATIENT)
Age: 64
End: 2024-01-30

## 2024-02-05 NOTE — ASU PATIENT PROFILE, ADULT - NS SC CAGE ALCOHOL EYE OPENER
"  To ensure quality you may receive a patient satisfaction survey. The greatest compliment you can give is \"Likely to Recommend.\"    Nice to talk with you today.  Thank you for your trust. Below is the plan discussed.-  Sol Bianchi PA-C     Plan:  Labs ordered.  Please call [phone] to set up a lab appt.   If interested in Zepbound out of pocket - if interested send me a MyChart   Here is the link for the  website to apply for a savings card:  Savings Card, Cost & Coverage Support  Zepbound (tirzepatide) ([email or web address])   In general - read through the below medications and let me know on MyChart your interests in which one to start.    Goals:  I recommend eating breakfast within an hour of waking up and eating every 4-6 hours during the day to keep your metabolism up. Prioritizing veggies and proteins for food choices is also recommended as medically appropriate.  Consider herbal teas, fresh fruits/veggies for snacking in the evenings. Focus on activities that make hard to snack at night.  Recommend 64oz (2L) water daily as medically appropriate (6-8 glasses)  Recommend limiting number of times of having alcohol to twice a week or less.   Recommend pursing regular exercise. 5 minutes of exercise every other day or every 2 days is a great place to start with aiming for 30 minutes 5 times a week as an end goal.  If you can, try to get some strength training twice weekly while losing weight to help preserve your muscle!      FOLLOW-UP:    Please call [phone] to schedule your next visit in 3 months.    .   Zepbound (Tirzepatide)    Zepbound (Tirzepatide): is an injectable prescription medicine recently FDA approved for adults with obesity or overweight with an additional condition affected by their weight.      It is given as a shot once a week. It activates the body's receptors for GIP (glucose-dependent insulinotropic polypeptide) and GLP-1 (glucagon-like peptide-1) receptor, two naturally " occurring hormones that help tell the brain that you are full. It also works is by slowing down how quickly food leaves your stomach.     You will start to feel lara more quickly, notice portion changes and eat less often between meals.  Patients are advised to eat slowly and purposefully. Give yourself less portions. You may find after starting this medication you have a new point of fullness. Maintain consistent eating schedule with meals +/- snacks and get in good hydration.    Dosing:  Initial dose: 2.5 mg injected subcutaneously once weekly for 4 weeks  Further dose changes can include: increase to 5 mg once weekly for 4 weeks, then 7.5 mg once weekly for 4 weeks, then 10 mg once weekly for 4 weeks then 12.5 mg once weekly for 4 weeks, then 15 mg (maximum dose) once weekly.    Dose changes are based on how you are tolerating the current dose, what benefits you are seeing at the current dose and if improvement in effectiveness of the drug is needed. The goal is to find the dose that works best for you with the least amount of side effects. You may find you reach this at a lower dose than the maximum dose.     Side effects: Common side effects include nausea, Heartburn,diarrhea, constipation. This is worse when starting the medication and with dose dose escalation.  It does improve with time. Stay adequately hydrated and eat small portions to decrease the risk of side effects.     The risk of pancreatitis (inflammation of the pancreas) has been associated with this type of medication, but is very rare.  If you have had pancreatitis in the past, this medication may not be for you. If you experience persistent severe abdominal pain (sometimes radiating to the back potentially accompanied by vomiting and have a fever), stop the medication and contact your provider immediately for assessment. They will do a blood test to check for pancreatitis.       Caution:   Tirzepatide (Zepbound, Mounjaro) May decrease  effectiveness of your oral birth control while starting and with dose adjustments.  Use backup forms of birth control if necessary.      For any questions or concerns please send a LAN-Power message to our team or call our weight management call center at [phone] during regular business hours.     There is a small chance you may have some low blood sugar after taking the medication.   The signs of low blood sugar are:  Weakness  Shaky   Hungry  Sweating  Confusion      See below for ways to treat low blood sugar without adding in lots of extra calories.      Treating Low Blood Sugar    If you have symptoms of low blood sugar (sweating, shaking, dizzy, confused) eat 15 grams of carbs and wait 15 minutes:    Glucose Tabs are best for sugars under 70 -  Dex4 or BD Glucose tablets are good, you will need to take 3-4 of these to equal 15 grams.     One small box of raisins  4 oz fruit juice box or   cup fruit juice  1 small apple  1 small banana    cup canned fruit in water    English muffin or a slice of bread with jelly   1 low fat frozen waffle with sugar-free syrup    cup cottage cheese with   cup frozen or fresh blueberries  1 cup skim or low-fat milk    cup whole grain cereal  4-6 crackers such as Triscuits      This medication is usually not covered by insurance and can be quite expensive. Sometimes a prior authorization is required, which may take up to 1-2 weeks for an insurance company to make a decision if they will cover the medication. Please be patient, you will be notified after a decision has been made.    Contact the nurse via [employer]Power or call [phone] if you have any questions or concerns. (Do not stop taking it if you don't think it's working. For some people it works without them knowing it.)     In order to get refills of this or any medication we prescribe you must be seen in the medical weight mgmt clinic every 2-4 months.   Using Your Mounjaro/Zepbound Pen  Medicine (tirzepatide)    Storing  your pens  Store your pens in the refrigerator until you are ready to use them. Don't let them freeze.  Your pen may be stored at room temperature for 21 days or fewer. Just make sure the temperature doesn't get higher than 86 or lower than 46 degrees Fahrenheit.   Protect the pens from light.  Never use any pens that have .    Check your pen before use:  The liquid in the pen window should be clear or light yellow. Bubbles are okay to see.   Do not use the pen if you can see the window contains any specks, is cloudy, or has changed color.  Make sure you have the medication and dose your health care provider prescribed.    Getting ready to inject:   1. Wash and dry your hands well.  2. Make sure the counter you use to place your supplies on is clean.  3. Make sure your injection time will not be interrupted by children or pets.  4. Have alcohol wipes or alcohol and cotton balls available to clean the injection site.   5. Choose your injection site. It can be on your stomach, back of upper arms, or upper legs. Remember to change your injection site each time you inject. Try to be at least 1 inch away from the previous one. Stay at least 1 inch away from your belly button.       Inject your dose:  1. Each pen is prefilled with one dose of medicine.    2. Pull off the grey cap at the bottom of the pen. Throw it in the trash. Do not put the cap back on the pen.   3. Make sure your pen is in the locked position. You will find the lock at the top of the pen.   4. Clean the injection site with alcohol.   5. Place the clear part of the pen against your skin. Unlock the pen by turning it to the unlock  position at the top.   6. Press and hold the purple injection button at the top of the pen. Listen for 2 clicks. The last click tells you the injection has been completed.   7. This injection is given 1 day a week. If you need to change the day you inject, there needs to be at least 3 days or 72 hours between the two  "doses.  8. If you miss a dose, you may take the dose within 4 days or 96 hours of the missed dose.   9. Your injection may be best tolerated if given at night.     Disposing of your pens:  Dispose of your pens in a puncture-resistant container (hard plastic bottle) or Sharps container.  Check with the county you live in on how to dispose of the container.  Do not recycle the container with used pens.       Of note, you may not be able to  your medication right away. It may require a prior authorization from your insurance company. This may take a week or more.     Naltrexone    Naltrexone is a medication that is used most often to help people who are troubled by dependence on prescription pain killers or alcohol. It has also been found to help with weight loss. Although it's not currently FDA approved for weight loss, it has been used safely for a number of years to help people who are carrying extra weight.     Just how Naltrexone helps with weight loss has not been exactly determined.  It seems to work by quieting down brain signals related to strong food cravings. Many of our patients use the word \"addiction\" to describe their feelings and constant thoughts about food. It makes sense then to treat the feeling of dependence on food, outside of real hunger, with a medication designed to help with other sorts of dependence.     Our patients on Naltrexone find that they:    >feel less interest in food   >think less about food and eating and have more time to think of other things   >find it easier to push the plate away   >have an easier time eating less    For some of our patients, these feelings are very immediate. Other patients, don't feel much of a change but find they've lost weight. Like all weight loss medications, Naltrexone works best when you help it work. This means:  1. Having less tempting high calorie (fattening) food around the house or office. (For people with strong cravings this is very " important.)   2. Staying away from situations or people that may trigger your cravings .   3. Eating out only one time or less each week.  4. Eating your meals at a table with the TV or computer off.    Side-effects. Naltrexone is generally well tolerated. The main side-effect we see is  nausea or a woozy feeling. A small number of people feel quite ill. Most people have a mild reaction and some people have no reaction at all.  The good news is that this feeling does go away.     In order to avoid nausea, please start the medication with half a pill for the first few days. Go on to a full pill if you are feeling well.      If you  are nauseated on 1/2 a pill it is okay to cut back to 1/4 pill ( a very small amount). Take this for a couple of days and work your way back up to a 1/2 pill and then a whole pill. Taking the medication at night or with food  to start also may help prevent the feeling of nausea.       WARNING: This medication blocks the action of opioid type pain medications.    If you routinely take narcotic pain medication like Codeine, Oxycontin,Percocet,Morphine,Dilaudid or Methodone, do not take this until you have talked with weight management staff.   2.  If you are planning surgery you should stop Naltrexone 4 days prior to the surgery.   3.  If you have an injury that requires pain medication, make sure the health care staff knows you take Naltrexone.       For any questions/concerns contact [facility] [phone]        METFORMIN    Metformin is a medication that is used to assist with lowering blood sugars in patients that have Pre-Diabetes or Diabetes. It has also been found to help with modest weight loss.    Metformin helps to lower blood sugars by lowering the amount of sugar (glucose) your liver produces that would otherwise cause your blood sugar to increase. It also makes your body respond better to insulin (the product that lowers your blood sugar).     Emerging  research reported in journals suggests metformin may also lead to weight loss as a result of changes in the appetite centers of the brain, shifts in the gut microbiome, and reversal of metabolic changes that usually happen with age.    Starting instructions:  Take 1 tablet by mouth daily with a meal for 1 week, then increase to 2 tablets daily with a meal, if tolerating can increase to 3 tablets daily with a meal or at bedtime.     Side-effects. Metformin is generally well tolerated. The main side-effects we see are: Diarrhea, nausea and stomach upset - this tends to subside over time as your body gets used to the medication. Taking this medications with food will lower these side effects.    Low blood sugar (hypoglycemia) is rare to occur with metformin, but can occur if you do not eat enough or are taking other medications that assist to lower blood sugar. The signs of low blood sugar are:  Weakness  Shaky   Hungry  Sweating  Confusion     For any questions or concerns please send a Hookit message to our team or call our weight management call center at [phone] during regular business hours. For questions during evenings or weekends your messages will be addressed during the next business day.  For emergencies please call 911 or seek immediate medical care.      In order to get refills of this or any medication we prescribe you must be seen in the medical weight mgmt clinic every 2-3 months.     METFORMIN    Metformin is a medication that is used to assist with lowering blood sugars in patients that have Pre-Diabetes or Diabetes. It has also been found to help with modest weight loss.    Metformin helps to lower blood sugars by lowering the amount of sugar (glucose) your liver produces that would otherwise cause your blood sugar to increase. It also makes your body respond better to insulin (the product that lowers your blood sugar).     Emerging research reported in journals suggests metformin may also lead  to weight loss as a result of changes in the appetite centers of the brain, shifts in the gut microbiome, and reversal of metabolic changes that usually happen with age.    Starting instructions:  Take 1 tablet by mouth daily with a meal for 1 week, then increase to 2 tablets daily with a meal, if tolerating can increase to 3 tablets daily with a meal or at bedtime.     Side-effects. Metformin is generally well tolerated. The main side-effects we see are: Diarrhea, nausea and stomach upset - this tends to subside over time as your body gets used to the medication. Taking this medications with food will lower these side effects.    Low blood sugar (hypoglycemia) is rare to occur with metformin, but can occur if you do not eat enough or are taking other medications that assist to lower blood sugar. The signs of low blood sugar are:  Weakness  Shaky   Hungry  Sweating  Confusion     For any questions or concerns please send a H[employer] message to our team or call our weight management call center at [phone] during regular business hours. For questions during evenings or weekends your messages will be addressed during the next business day.  For emergencies please call 911 or seek immediate medical care.      In order to get refills of this or any medication we prescribe you must be seen in the medical weight mgmt clinic every 2-3 months.                            no

## 2024-02-07 ENCOUNTER — NON-APPOINTMENT (OUTPATIENT)
Age: 64
End: 2024-02-07

## 2024-02-07 ENCOUNTER — APPOINTMENT (OUTPATIENT)
Dept: CARDIOLOGY | Facility: CLINIC | Age: 64
End: 2024-02-07
Payer: COMMERCIAL

## 2024-02-07 VITALS
BODY MASS INDEX: 27.49 KG/M2 | DIASTOLIC BLOOD PRESSURE: 85 MMHG | WEIGHT: 165 LBS | RESPIRATION RATE: 16 BRPM | HEART RATE: 58 BPM | SYSTOLIC BLOOD PRESSURE: 130 MMHG | OXYGEN SATURATION: 96 % | TEMPERATURE: 97 F | HEIGHT: 65 IN

## 2024-02-07 DIAGNOSIS — I25.10 ATHEROSCLEROTIC HEART DISEASE OF NATIVE CORONARY ARTERY W/OUT ANGINA PECTORIS: ICD-10-CM

## 2024-02-07 DIAGNOSIS — E78.5 HYPERLIPIDEMIA, UNSPECIFIED: ICD-10-CM

## 2024-02-07 PROCEDURE — 99204 OFFICE O/P NEW MOD 45 MIN: CPT

## 2024-02-07 PROCEDURE — 93000 ELECTROCARDIOGRAM COMPLETE: CPT

## 2024-02-07 RX ORDER — ATORVASTATIN CALCIUM 40 MG/1
40 TABLET, FILM COATED ORAL DAILY
Refills: 0 | Status: ACTIVE | COMMUNITY

## 2024-02-07 RX ORDER — ATORVASTATIN CALCIUM 20 MG/1
20 TABLET, FILM COATED ORAL
Qty: 90 | Refills: 2 | Status: ACTIVE | COMMUNITY
Start: 2024-02-07 | End: 1900-01-01

## 2024-02-07 NOTE — HISTORY OF PRESENT ILLNESS
[FreeTextEntry1] : 64 y/o male with history of arthritis, nephrolithiasis, DL, recently was evaluated in the ER for atypical chest pain and a CCTA was done, revealing mild LAD stenosis. Patient was discharged with recommendation for cardiology evaluation and presents today. He reports chest pain is unchanged. Non-exertional, b/l, moderate, + tender, he feels it is radiating to the chest from his right knee. Physically active with no angina.

## 2024-02-07 NOTE — ASSESSMENT
[FreeTextEntry1] : 64 y/o male with non-obstructive CAD  Chest pain Non-cardiac No angina Tylenol PRN  CAD Non-obstructive CCTA reviewed ECG noted Primary prevention ASA and statin  DL C/w Lipitor. Check lipid panel in 3 months with the PMD  Primary prevention. Patient was advised about healthy lifestyle changes, including diet and exercise.  F/u PRN

## 2024-02-09 ENCOUNTER — APPOINTMENT (OUTPATIENT)
Dept: ORTHOPEDIC SURGERY | Facility: CLINIC | Age: 64
End: 2024-02-09
Payer: OTHER MISCELLANEOUS

## 2024-02-09 DIAGNOSIS — S46.009D UNSPECIFIED INJURY OF MUSCLE(S) AND TENDON(S) OF THE ROTATOR CUFF OF UNSPECIFIED SHOULDER, SUBSEQUENT ENCOUNTER: ICD-10-CM

## 2024-02-09 PROCEDURE — 99243 OFF/OP CNSLTJ NEW/EST LOW 30: CPT

## 2024-02-09 PROCEDURE — 99245 OFF/OP CONSLTJ NEW/EST HI 55: CPT

## 2024-04-23 NOTE — HISTORY OF PRESENT ILLNESS
[de-identified] : 03/09/2022  Surgery - The patient was taken to the OR today. He had a right sh tear, impingement, AC joint arthritis, adhesions. PROCEDURE PERFORMED: 1. Right shoulder arthroscopic extensive debridement of rotator cuff, labrum,  (78940.59). 2. Right shoulder arthroscopic subacromial decompression (09941.59). 3. Right shoulder arthroscopic distal clavicle excision (92960.59) 4. Right shoulder arthroscopic lysis of adhesions (42137.59).  Pt is s/p right shoulder arthroscopy  pt here for [facility]  NAD Right shoulder Incisions healed Mild diffuse TTP Compartments soft and NT ff 0-150 Abd 110 er 40 ir l1 NVI  s/p right shoulder arthroscopy this is a wc case and slu

## 2024-04-23 NOTE — WORK
[Right] : right [FreeTextEntry7] : shoulder [FreeTextEntry8] : ff 0-150 Abd 110 er 40 ir l1 [FreeTextEntry5] : 30 [de-identified] : measured 3 times with goniometer 20% for loss of abduction 10% for loss of IR and ER

## 2024-07-11 ENCOUNTER — APPOINTMENT (OUTPATIENT)
Dept: ORTHOPEDIC SURGERY | Facility: CLINIC | Age: 64
End: 2024-07-11
Payer: OTHER MISCELLANEOUS

## 2024-07-11 DIAGNOSIS — S46.009D UNSPECIFIED INJURY OF MUSCLE(S) AND TENDON(S) OF THE ROTATOR CUFF OF UNSPECIFIED SHOULDER, SUBSEQUENT ENCOUNTER: ICD-10-CM

## 2024-07-11 PROCEDURE — 99213 OFFICE O/P EST LOW 20 MIN: CPT

## 2024-09-19 ENCOUNTER — APPOINTMENT (OUTPATIENT)
Dept: ORTHOPEDIC SURGERY | Facility: CLINIC | Age: 64
End: 2024-09-19
Payer: OTHER MISCELLANEOUS

## 2024-09-19 VITALS — BODY MASS INDEX: 28.16 KG/M2 | WEIGHT: 169 LBS | HEIGHT: 65 IN

## 2024-09-19 DIAGNOSIS — S46.009D UNSPECIFIED INJURY OF MUSCLE(S) AND TENDON(S) OF THE ROTATOR CUFF OF UNSPECIFIED SHOULDER, SUBSEQUENT ENCOUNTER: ICD-10-CM

## 2024-09-19 PROCEDURE — 99213 OFFICE O/P EST LOW 20 MIN: CPT | Mod: ACP

## 2024-09-19 RX ORDER — NABUMETONE 750 MG/1
750 TABLET, FILM COATED ORAL
Qty: 60 | Refills: 1 | Status: ACTIVE | COMMUNITY
Start: 2024-09-19 | End: 1900-01-01

## 2024-09-19 NOTE — DISCUSSION/SUMMARY
[de-identified] : Right shoulder pain follow-up  HPI Patient is a 64-year-old male reports to office for subsequent reevaluation of his right shoulder pain.  Lifting, certain range of motion, and palpating certain areas aggravate the patient's pain at times.  He is right-hand dominant.  He has been undergoing formal physical therapy which has given him some relief.  Denies any numbness or tingling.  03/09/2022 Surgery- PROCEDURE PERFORMED: 1. Right shoulder arthroscopic extensive debridement of rotator cuff, labrum, 2. Right shoulder arthroscopic subacromial decompression  3. Right shoulder arthroscopic distal clavicle excision  4. Right shoulder arthroscopic lysis of adhesions  Right shoulder exam is as follows: No swelling noted.  No erythema or ecchymosis.  TTP anterior shoulder.  Active forward flexion/abduction to approximate 120 degrees, passive forward flexion/abduction to approximately 150 degrees with stiffness and pain.  Internal rotation to L5 and external rotation to 85 degrees with pain.  There is mild decrease in strength with discomfort and pain.  Mild pain with Jobes and Garfield's testing.  Light touch intact throughout.  Neurovascular intact.  Assessment/plan Will order new right shoulder MRI to rule out possible tear.  Discontinue meloxicam.  Nabumetone 750 mg Rx sent to his pharmacy to help alleviate his symptoms instead.  The patient was advised to rest/ice the area and may alternate with warm compresses as needed.    The shoulder conditioning program from the [facility] was given to the patient so they may try that at home.  Patient is currently not working as he retired from his job.  Follow-up in 3 months.  May consider cortisone injection at next visit if still symptomatic.  All questions/concerns were answered in detail.

## 2024-12-19 ENCOUNTER — APPOINTMENT (OUTPATIENT)
Dept: ORTHOPEDIC SURGERY | Facility: CLINIC | Age: 64
End: 2024-12-19
Payer: OTHER MISCELLANEOUS

## 2024-12-19 DIAGNOSIS — S46.009D UNSPECIFIED INJURY OF MUSCLE(S) AND TENDON(S) OF THE ROTATOR CUFF OF UNSPECIFIED SHOULDER, SUBSEQUENT ENCOUNTER: ICD-10-CM

## 2024-12-19 DIAGNOSIS — M54.12 RADICULOPATHY, CERVICAL REGION: ICD-10-CM

## 2024-12-19 PROCEDURE — 99213 OFFICE O/P EST LOW 20 MIN: CPT | Mod: ACP

## 2025-02-07 ENCOUNTER — APPOINTMENT (OUTPATIENT)
Dept: ORTHOPEDIC SURGERY | Facility: CLINIC | Age: 65
End: 2025-02-07
Payer: OTHER MISCELLANEOUS

## 2025-02-07 DIAGNOSIS — M25.511 PAIN IN RIGHT SHOULDER: ICD-10-CM

## 2025-02-07 DIAGNOSIS — M54.12 RADICULOPATHY, CERVICAL REGION: ICD-10-CM

## 2025-02-07 PROCEDURE — 99213 OFFICE O/P EST LOW 20 MIN: CPT | Mod: ACP

## 2025-02-07 RX ORDER — DICLOFENAC SODIUM 3 G/100G
3 GEL TOPICAL
Qty: 1 | Refills: 2 | Status: ACTIVE | COMMUNITY
Start: 2025-02-07 | End: 1900-01-01

## 2025-07-10 ENCOUNTER — APPOINTMENT (OUTPATIENT)
Dept: ORTHOPEDIC SURGERY | Facility: CLINIC | Age: 65
End: 2025-07-10